# Patient Record
Sex: MALE | Race: WHITE | NOT HISPANIC OR LATINO | Employment: FULL TIME | URBAN - METROPOLITAN AREA
[De-identification: names, ages, dates, MRNs, and addresses within clinical notes are randomized per-mention and may not be internally consistent; named-entity substitution may affect disease eponyms.]

---

## 2018-05-01 ENCOUNTER — HOSPITAL ENCOUNTER (OUTPATIENT)
Dept: LAB | Facility: MEDICAL CENTER | Age: 43
End: 2018-05-01
Attending: PSYCHIATRY & NEUROLOGY
Payer: MEDICAID

## 2018-05-01 LAB
ALBUMIN SERPL BCP-MCNC: 4.2 G/DL (ref 3.2–4.9)
ALBUMIN/GLOB SERPL: 1.4 G/DL
ALP SERPL-CCNC: 99 U/L (ref 30–99)
ALT SERPL-CCNC: 38 U/L (ref 2–50)
ANION GAP SERPL CALC-SCNC: 11 MMOL/L (ref 0–11.9)
APPEARANCE UR: CLEAR
AST SERPL-CCNC: 22 U/L (ref 12–45)
BILIRUB SERPL-MCNC: 0.7 MG/DL (ref 0.1–1.5)
BILIRUB UR QL STRIP.AUTO: NEGATIVE
BUN SERPL-MCNC: 14 MG/DL (ref 8–22)
CALCIUM SERPL-MCNC: 10.2 MG/DL (ref 8.5–10.5)
CHLORIDE SERPL-SCNC: 104 MMOL/L (ref 96–112)
CO2 SERPL-SCNC: 26 MMOL/L (ref 20–33)
COLOR UR: YELLOW
CREAT SERPL-MCNC: 0.98 MG/DL (ref 0.5–1.4)
GLOBULIN SER CALC-MCNC: 2.9 G/DL (ref 1.9–3.5)
GLUCOSE SERPL-MCNC: 77 MG/DL (ref 65–99)
GLUCOSE UR STRIP.AUTO-MCNC: NEGATIVE MG/DL
KETONES UR STRIP.AUTO-MCNC: NEGATIVE MG/DL
LEUKOCYTE ESTERASE UR QL STRIP.AUTO: NEGATIVE
MICRO URNS: NORMAL
NITRITE UR QL STRIP.AUTO: NEGATIVE
PH UR STRIP.AUTO: 7 [PH]
POTASSIUM SERPL-SCNC: 4 MMOL/L (ref 3.6–5.5)
PROT SERPL-MCNC: 7.1 G/DL (ref 6–8.2)
PROT UR QL STRIP: NEGATIVE MG/DL
RBC UR QL AUTO: NEGATIVE
SODIUM SERPL-SCNC: 141 MMOL/L (ref 135–145)
SP GR UR STRIP.AUTO: 1.01
UROBILINOGEN UR STRIP.AUTO-MCNC: 0.2 MG/DL

## 2018-05-01 PROCEDURE — 80053 COMPREHEN METABOLIC PANEL: CPT

## 2018-05-01 PROCEDURE — 81003 URINALYSIS AUTO W/O SCOPE: CPT

## 2018-05-01 PROCEDURE — 36415 COLL VENOUS BLD VENIPUNCTURE: CPT

## 2020-02-18 ENCOUNTER — HOSPITAL ENCOUNTER (OUTPATIENT)
Facility: MEDICAL CENTER | Age: 45
End: 2020-02-18
Attending: SURGERY | Admitting: SURGERY
Payer: MEDICAID

## 2020-02-18 ENCOUNTER — ANESTHESIA (OUTPATIENT)
Dept: SURGERY | Facility: MEDICAL CENTER | Age: 45
End: 2020-02-18
Payer: MEDICAID

## 2020-02-18 ENCOUNTER — ANESTHESIA EVENT (OUTPATIENT)
Dept: SURGERY | Facility: MEDICAL CENTER | Age: 45
End: 2020-02-18
Payer: MEDICAID

## 2020-02-18 VITALS
BODY MASS INDEX: 32.2 KG/M2 | TEMPERATURE: 97.7 F | SYSTOLIC BLOOD PRESSURE: 92 MMHG | OXYGEN SATURATION: 93 % | RESPIRATION RATE: 16 BRPM | DIASTOLIC BLOOD PRESSURE: 56 MMHG | HEART RATE: 59 BPM | HEIGHT: 73 IN | WEIGHT: 242.95 LBS

## 2020-02-18 DIAGNOSIS — G89.18 POSTOPERATIVE PAIN: ICD-10-CM

## 2020-02-18 PROCEDURE — 501838 HCHG SUTURE GENERAL: Performed by: SURGERY

## 2020-02-18 PROCEDURE — 700101 HCHG RX REV CODE 250

## 2020-02-18 PROCEDURE — 700105 HCHG RX REV CODE 258: Performed by: SURGERY

## 2020-02-18 PROCEDURE — 700111 HCHG RX REV CODE 636 W/ 250 OVERRIDE (IP): Performed by: ANESTHESIOLOGY

## 2020-02-18 PROCEDURE — 700101 HCHG RX REV CODE 250: Performed by: ANESTHESIOLOGY

## 2020-02-18 PROCEDURE — 160009 HCHG ANES TIME/MIN: Performed by: SURGERY

## 2020-02-18 PROCEDURE — 160025 RECOVERY II MINUTES (STATS): Performed by: SURGERY

## 2020-02-18 PROCEDURE — 160027 HCHG SURGERY MINUTES - 1ST 30 MINS LEVEL 2: Performed by: SURGERY

## 2020-02-18 PROCEDURE — 700101 HCHG RX REV CODE 250: Performed by: SURGERY

## 2020-02-18 PROCEDURE — A9270 NON-COVERED ITEM OR SERVICE: HCPCS | Performed by: ANESTHESIOLOGY

## 2020-02-18 PROCEDURE — 160035 HCHG PACU - 1ST 60 MINS PHASE I: Performed by: SURGERY

## 2020-02-18 PROCEDURE — 700102 HCHG RX REV CODE 250 W/ 637 OVERRIDE(OP): Performed by: ANESTHESIOLOGY

## 2020-02-18 PROCEDURE — 160038 HCHG SURGERY MINUTES - EA ADDL 1 MIN LEVEL 2: Performed by: SURGERY

## 2020-02-18 PROCEDURE — 160048 HCHG OR STATISTICAL LEVEL 1-5: Performed by: SURGERY

## 2020-02-18 PROCEDURE — 160046 HCHG PACU - 1ST 60 MINS PHASE II: Performed by: SURGERY

## 2020-02-18 PROCEDURE — 160036 HCHG PACU - EA ADDL 30 MINS PHASE I: Performed by: SURGERY

## 2020-02-18 PROCEDURE — 160002 HCHG RECOVERY MINUTES (STAT): Performed by: SURGERY

## 2020-02-18 RX ORDER — OMEPRAZOLE 20 MG/1
20 CAPSULE, DELAYED RELEASE ORAL
COMMUNITY
End: 2021-07-13

## 2020-02-18 RX ORDER — BACLOFEN 10 MG/1
10 TABLET ORAL PRN
COMMUNITY
End: 2021-07-13

## 2020-02-18 RX ORDER — HYDROMORPHONE HYDROCHLORIDE 1 MG/ML
0.1 INJECTION, SOLUTION INTRAMUSCULAR; INTRAVENOUS; SUBCUTANEOUS
Status: DISCONTINUED | OUTPATIENT
Start: 2020-02-18 | End: 2020-02-18 | Stop reason: HOSPADM

## 2020-02-18 RX ORDER — HYDROMORPHONE HYDROCHLORIDE 1 MG/ML
0.2 INJECTION, SOLUTION INTRAMUSCULAR; INTRAVENOUS; SUBCUTANEOUS
Status: DISCONTINUED | OUTPATIENT
Start: 2020-02-18 | End: 2020-02-18 | Stop reason: HOSPADM

## 2020-02-18 RX ORDER — METOPROLOL TARTRATE 1 MG/ML
1 INJECTION, SOLUTION INTRAVENOUS
Status: DISCONTINUED | OUTPATIENT
Start: 2020-02-18 | End: 2020-02-18 | Stop reason: HOSPADM

## 2020-02-18 RX ORDER — OXYCODONE HCL 5 MG/5 ML
5 SOLUTION, ORAL ORAL
Status: COMPLETED | OUTPATIENT
Start: 2020-02-18 | End: 2020-02-18

## 2020-02-18 RX ORDER — BUPIVACAINE HYDROCHLORIDE AND EPINEPHRINE 5; 5 MG/ML; UG/ML
INJECTION, SOLUTION EPIDURAL; INTRACAUDAL; PERINEURAL
Status: DISCONTINUED
Start: 2020-02-18 | End: 2020-02-18 | Stop reason: HOSPADM

## 2020-02-18 RX ORDER — ACETAMINOPHEN 325 MG/1
325 TABLET ORAL EVERY 6 HOURS
Qty: 60 TAB | Refills: 0 | Status: SHIPPED | OUTPATIENT
Start: 2020-02-18 | End: 2021-07-13

## 2020-02-18 RX ORDER — SODIUM CHLORIDE, SODIUM LACTATE, POTASSIUM CHLORIDE, CALCIUM CHLORIDE 600; 310; 30; 20 MG/100ML; MG/100ML; MG/100ML; MG/100ML
INJECTION, SOLUTION INTRAVENOUS CONTINUOUS
Status: DISCONTINUED | OUTPATIENT
Start: 2020-02-18 | End: 2020-02-18 | Stop reason: HOSPADM

## 2020-02-18 RX ORDER — KETOROLAC TROMETHAMINE 30 MG/ML
INJECTION, SOLUTION INTRAMUSCULAR; INTRAVENOUS PRN
Status: DISCONTINUED | OUTPATIENT
Start: 2020-02-18 | End: 2020-02-18 | Stop reason: SURG

## 2020-02-18 RX ORDER — MAGNESIUM SULFATE HEPTAHYDRATE 40 MG/ML
INJECTION, SOLUTION INTRAVENOUS PRN
Status: DISCONTINUED | OUTPATIENT
Start: 2020-02-18 | End: 2020-02-18 | Stop reason: SURG

## 2020-02-18 RX ORDER — OXYCODONE HCL 5 MG/5 ML
10 SOLUTION, ORAL ORAL
Status: COMPLETED | OUTPATIENT
Start: 2020-02-18 | End: 2020-02-18

## 2020-02-18 RX ORDER — HYDRALAZINE HYDROCHLORIDE 20 MG/ML
5 INJECTION INTRAMUSCULAR; INTRAVENOUS
Status: DISCONTINUED | OUTPATIENT
Start: 2020-02-18 | End: 2020-02-18 | Stop reason: HOSPADM

## 2020-02-18 RX ORDER — ONDANSETRON 2 MG/ML
4 INJECTION INTRAMUSCULAR; INTRAVENOUS
Status: DISCONTINUED | OUTPATIENT
Start: 2020-02-18 | End: 2020-02-18 | Stop reason: HOSPADM

## 2020-02-18 RX ORDER — HALOPERIDOL 5 MG/ML
1 INJECTION INTRAMUSCULAR
Status: DISCONTINUED | OUTPATIENT
Start: 2020-02-18 | End: 2020-02-18 | Stop reason: HOSPADM

## 2020-02-18 RX ORDER — DOCUSATE SODIUM 100 MG/1
100 CAPSULE, LIQUID FILLED ORAL 2 TIMES DAILY
Qty: 60 CAP | Refills: 0 | Status: SHIPPED | OUTPATIENT
Start: 2020-02-18 | End: 2021-07-13

## 2020-02-18 RX ORDER — BUPIVACAINE HYDROCHLORIDE AND EPINEPHRINE 5; 5 MG/ML; UG/ML
INJECTION, SOLUTION EPIDURAL; INTRACAUDAL; PERINEURAL
Status: DISCONTINUED | OUTPATIENT
Start: 2020-02-18 | End: 2020-02-18 | Stop reason: HOSPADM

## 2020-02-18 RX ORDER — DEXAMETHASONE SODIUM PHOSPHATE 4 MG/ML
INJECTION, SOLUTION INTRA-ARTICULAR; INTRALESIONAL; INTRAMUSCULAR; INTRAVENOUS; SOFT TISSUE PRN
Status: DISCONTINUED | OUTPATIENT
Start: 2020-02-18 | End: 2020-02-18 | Stop reason: SURG

## 2020-02-18 RX ORDER — IBUPROFEN 600 MG/1
600 TABLET ORAL EVERY 6 HOURS
Qty: 45 TAB | Refills: 0 | Status: SHIPPED | OUTPATIENT
Start: 2020-02-18

## 2020-02-18 RX ORDER — DIPHENHYDRAMINE HYDROCHLORIDE 50 MG/ML
12.5 INJECTION INTRAMUSCULAR; INTRAVENOUS
Status: DISCONTINUED | OUTPATIENT
Start: 2020-02-18 | End: 2020-02-18 | Stop reason: HOSPADM

## 2020-02-18 RX ORDER — DIPHENHYDRAMINE HCL 25 MG
25 TABLET ORAL EVERY 6 HOURS PRN
Status: DISCONTINUED | OUTPATIENT
Start: 2020-02-18 | End: 2020-02-18 | Stop reason: HOSPADM

## 2020-02-18 RX ORDER — HYDROMORPHONE HYDROCHLORIDE 1 MG/ML
0.4 INJECTION, SOLUTION INTRAMUSCULAR; INTRAVENOUS; SUBCUTANEOUS
Status: DISCONTINUED | OUTPATIENT
Start: 2020-02-18 | End: 2020-02-18 | Stop reason: HOSPADM

## 2020-02-18 RX ORDER — MEPERIDINE HYDROCHLORIDE 25 MG/ML
12.5 INJECTION INTRAMUSCULAR; INTRAVENOUS; SUBCUTANEOUS
Status: DISCONTINUED | OUTPATIENT
Start: 2020-02-18 | End: 2020-02-18 | Stop reason: HOSPADM

## 2020-02-18 RX ORDER — DIPHENHYDRAMINE HYDROCHLORIDE 50 MG/ML
25 INJECTION INTRAMUSCULAR; INTRAVENOUS EVERY 6 HOURS PRN
Status: DISCONTINUED | OUTPATIENT
Start: 2020-02-18 | End: 2020-02-18 | Stop reason: HOSPADM

## 2020-02-18 RX ORDER — CEFAZOLIN SODIUM 1 G/3ML
INJECTION, POWDER, FOR SOLUTION INTRAMUSCULAR; INTRAVENOUS PRN
Status: DISCONTINUED | OUTPATIENT
Start: 2020-02-18 | End: 2020-02-18 | Stop reason: SURG

## 2020-02-18 RX ORDER — ONDANSETRON 2 MG/ML
INJECTION INTRAMUSCULAR; INTRAVENOUS PRN
Status: DISCONTINUED | OUTPATIENT
Start: 2020-02-18 | End: 2020-02-18 | Stop reason: SURG

## 2020-02-18 RX ORDER — MIDAZOLAM HYDROCHLORIDE 1 MG/ML
1 INJECTION INTRAMUSCULAR; INTRAVENOUS
Status: DISCONTINUED | OUTPATIENT
Start: 2020-02-18 | End: 2020-02-18 | Stop reason: HOSPADM

## 2020-02-18 RX ORDER — OXYCODONE HYDROCHLORIDE 5 MG/1
5 TABLET ORAL EVERY 4 HOURS PRN
Qty: 12 TAB | Refills: 0 | Status: SHIPPED | OUTPATIENT
Start: 2020-02-18 | End: 2020-02-21

## 2020-02-18 RX ADMIN — KETOROLAC TROMETHAMINE 30 MG: 30 INJECTION, SOLUTION INTRAMUSCULAR at 15:52

## 2020-02-18 RX ADMIN — DEXAMETHASONE SODIUM PHOSPHATE 4 MG: 4 INJECTION, SOLUTION INTRA-ARTICULAR; INTRALESIONAL; INTRAMUSCULAR; INTRAVENOUS; SOFT TISSUE at 15:37

## 2020-02-18 RX ADMIN — SODIUM CHLORIDE, POTASSIUM CHLORIDE, SODIUM LACTATE AND CALCIUM CHLORIDE: 600; 310; 30; 20 INJECTION, SOLUTION INTRAVENOUS at 13:40

## 2020-02-18 RX ADMIN — FENTANYL CITRATE 100 MCG: 50 INJECTION, SOLUTION INTRAMUSCULAR; INTRAVENOUS at 15:37

## 2020-02-18 RX ADMIN — MIDAZOLAM HYDROCHLORIDE 2 MG: 1 INJECTION, SOLUTION INTRAMUSCULAR; INTRAVENOUS at 15:37

## 2020-02-18 RX ADMIN — FENTANYL CITRATE 25 MCG: 0.05 INJECTION, SOLUTION INTRAMUSCULAR; INTRAVENOUS at 17:27

## 2020-02-18 RX ADMIN — OXYCODONE HYDROCHLORIDE 10 MG: 5 SOLUTION ORAL at 17:26

## 2020-02-18 RX ADMIN — MAGNESIUM SULFATE IN WATER 1 G: 40 INJECTION, SOLUTION INTRAVENOUS at 15:37

## 2020-02-18 RX ADMIN — LIDOCAINE HYDROCHLORIDE 40 MG: 20 INJECTION, SOLUTION INFILTRATION; PERINEURAL at 15:37

## 2020-02-18 RX ADMIN — FENTANYL CITRATE 50 MCG: 50 INJECTION, SOLUTION INTRAMUSCULAR; INTRAVENOUS at 18:05

## 2020-02-18 RX ADMIN — PROPOFOL 180 MCG/KG/MIN: 10 INJECTION, EMULSION INTRAVENOUS at 15:37

## 2020-02-18 RX ADMIN — PROPOFOL 200 MG: 10 INJECTION, EMULSION INTRAVENOUS at 15:31

## 2020-02-18 RX ADMIN — ONDANSETRON 4 MG: 2 INJECTION INTRAMUSCULAR; INTRAVENOUS at 15:37

## 2020-02-18 RX ADMIN — CEFAZOLIN 2 G: 330 INJECTION, POWDER, FOR SOLUTION INTRAMUSCULAR; INTRAVENOUS at 15:27

## 2020-02-18 RX ADMIN — SODIUM CHLORIDE, POTASSIUM CHLORIDE, SODIUM LACTATE AND CALCIUM CHLORIDE: 600; 310; 30; 20 INJECTION, SOLUTION INTRAVENOUS at 15:18

## 2020-02-18 RX ADMIN — EPHEDRINE SULFATE 10 MG: 50 INJECTION INTRAVENOUS at 16:27

## 2020-02-18 ASSESSMENT — PAIN SCALES - GENERAL: PAIN_LEVEL: 2

## 2020-02-18 NOTE — ANESTHESIA TIME REPORT
Anesthesia Start and Stop Event Times     Date Time Event    2/18/2020 1523 Ready for Procedure        Responsible Staff    No responsible staff documented.       Preop Diagnosis (Free Text):  Pre-op Diagnosis     UMBILICAL HERNIA        Preop Diagnosis (Codes):    Post op Diagnosis  Umbilical hernia      Premium Reason  A. 3PM - 7AM    Comments:

## 2020-02-18 NOTE — ANESTHESIA PROCEDURE NOTES
Airway  Date/Time: 2/18/2020 3:32 PM  Performed by: Lester Castellanos M.D.  Authorized by: Lester Castellanos M.D.     Location:  OR  Urgency:  Elective  Indications for Airway Management:  Anesthesia  Spontaneous Ventilation: absent    Sedation Level:  Deep  Preoxygenated: Yes    Patient Position:  Sniffing  Final Airway Type:  Supraglottic airway  Final Supraglottic Airway:  Standard LMA  SGA Size:  4  Number of Attempts at Approach:  1

## 2020-02-18 NOTE — OP REPORT
Operative Report    Date: 2/18/2020    Surgeon: Jakob Lewis M.D.     Assistant: None    Pre-operative Diagnosis: Umbilical Hernia    Post-operative Diagnosis: Same    Procedure: Primary open umbilical hernia repair    ASA Classification: I.    Indications: This is a 44 y.o. male who presented with symptoms of umbilical hernia.  Patient is here for repair.    Findings: Primary fascial closure obtained    Wound Classification: Class I, I, Clean.    Procedure in detail: The patient was seen and examined in the preoperative holding area.  The risks benefits and alternatives of the procedure were discussed with the patient who wished to proceed with the procedure as described.  The patient was transferred to the operating room placed in supine position and all pressure points were properly padded.  General endotracheal anesthesia was induced and preoperative antibiotics were given per [facility] protocol.  Patient's abdomen was prepped with ChloraPrep and draped in the normal sterile fashion.  A timeout was performed confirming correct patient, correct procedure, and that all necessary equipment was in the room.      A curvilinear incision was made over the umbilicus after infusing local anesthetic.  A combination of blunt electrodissection was used down to the level of the fascia we then bluntly dissected around the umbilicus and this was elevated.  The umbilicus was dissected free from the underlying hernia sac.  The hernia sac was then reduced and the fascial edges were grasped.  Multiple interrupted 0 Ethibond sutures used to close the umbilical hernia primarily.    The umbilicus was then pexied down using a interrupted 3-0 Vicryl suture.  Skin was closed with a running 4-0 Monocryl suture in a subcuticular fashion.  Dermabond was then placed over the wounds.    The patient was awakened from general anesthetic, and was taken to the recovery room in stable condition.    Sponge and needle counts were correct at the end of  the case.     Specimen: none    EBL: 10mL    Dispo: stable, extubated, to PACU    Jakob Lewis M.D.  [facility]  [phone]

## 2020-02-19 NOTE — ANESTHESIA QCDR
2019 [facility] Clinical Data Registry (for Quality Improvement)     Postoperative nausea/vomiting risk protocol (Adult = 18 yrs and Pediatric 3-17 yrs)- (430 and 463)  General inhalation anesthetic (NOT TIVA) with PONV risk factors: No  Provision of anti-emetic therapy with at least 2 different classes of agents: N/A  Patient DID NOT receive anti-emetic therapy and reason is documented in Medical Record: N/A    Multimodal Pain Management- (477)  Non-emergent surgery AND patient age >= 18: Yes  Use of Multimodal Pain Management, two or more drugs and/or interventions, NOT including systemic opioids: Yes  Exception: Documented allergy to multiple classes of analgesics: N/A    Smoking Abstinence (404)  Patient is current smoker (cigarette, pipe, e-cig, marijuanna): Yes  Elective Surgery: Yes  Abstinence instructions provided prior to day of surgery: No  Patient abstained from smoking on day of surgery:     Pre-Op Beta-Blocker in Isolated CABG (44)  Isolated CABG AND patient age >= 18: No  Beta-blocker admin within 24 hours of surgical incision:   Exception:of medical reason(s) for not administering beta blocker within 24 hours prior to surgical incision (e.g., not  indicated,other medical reason):     PACU assessment of acute postoperative pain prior to Anesthesia Care End- Applies to Patients Age = 18- (ABG7)  Initial PACU pain score is which of the following: < 7/10  Patient unable to report pain score: N/A    Post-anesthetic transfer of care checklist/protocol to PACU/ICU- (426 and 427)  Upon conclusion of case, patient transferred to which of the following locations: PACU/Non-ICU  Use of transfer checklist/protocol: Yes  Exclusion: Service Performed in Patient Hospital Room (and thus did not require transfer): N/A  Unplanned admission to ICU related to anesthesia service up through end of PACU care- (MD51)  Unplanned admission to ICU (not initially anticipated at anesthesia start time): No

## 2020-02-19 NOTE — DISCHARGE INSTRUCTIONS
Hernia Repair Discharge Instructions:    ACTIVITIES: Upon discharge from the hospital, the day of surgery it is requested that you do no significant physical activity and limit mental activities, as you have had sedation. The day after surgery, you may resume activities of daily living, but for six weeks, it is recommended that you do no strenuous activities or heavy lifting (greater than 10 pounds).     DRIVING: You may drive whenever you are off pain medications and are able to perform the activities needed to drive, i.e. turning, bending, twisting, etc.     WOUND: It is not unusual for patients to experience swelling and even bruising at the hernia repair site.      ICE: please use ice on the wound to decrease the swelling for the first 24 hours and then discontinue.     BATHING: The dressing can be removed two days after surgery and the wound can then be wetted in a shower as normal, but avoid submersion in water (tub bath) for at least 2 weeks.    PAIN MEDICATION: You will be given a prescription for pain medication at discharge. Please take these as directed. It is important to remember not to take medications on an empty stomach as this may cause nausea.     BOWEL FUNCTION: After hernia repair, it is not uncommon for patients to experience constipation. This is due to decreasing activity levels as well as pain medications. You may wish to use a stool softener beginning immediately after surgery, and you may or may not need to use a laxative (Milk of Magnesia, Ex-lax; Senokot, etc.) as well.            CALL IF YOU HAVE: (1) Fevers to more than 1010 F, (2) Unusual chest or leg pain,  (3) Drainage or fluid from incision that may be foul smelling, increased  tenderness or soreness at the wound or the wound edges are no longer  together,redness or swelling at the incision site. Please do not hesitate to call  with any other questions.     APPOINTMENT: Contact our office at [phone] for a follow-up appointment  in 2 weeks following your procedure.     If you have any additional questions, please do not hesitate to call the office.     Office address:  [address], [address] [place] [address]        ACTIVITY: Rest and take it easy for the first 24 hours.  A responsible adult is recommended to remain with you during that time.  It is normal to feel sleepy.  We encourage you to not do anything that requires balance, judgment or coordination.    MILD FLU-LIKE SYMPTOMS ARE NORMAL. YOU MAY EXPERIENCE GENERALIZED MUSCLE ACHES, THROAT IRRITATION, HEADACHE AND/OR SOME NAUSEA.    FOR 24 HOURS DO NOT:  Drive, operate machinery or run household appliances.  Drink beer or alcoholic beverages.   Make important decisions or sign legal documents.    SPECIAL INSTRUCTIONS: see above    DIET: To avoid nausea, slowly advance diet as tolerated, avoiding spicy or greasy foods for the first day.  Add more substantial food to your diet according to your physician's instructions.  Babies can be fed formula or breast milk as soon as they are hungry.  INCREASE FLUIDS AND FIBER TO AVOID CONSTIPATION.      FOLLOW-UP APPOINTMENT:  A follow-up appointment should be arranged with your doctor ; call to schedule.    You should CALL YOUR PHYSICIAN if you develop:  Fever greater than 101 degrees F.  Pain not relieved by medication, or persistent nausea or vomiting.  Excessive bleeding (blood soaking through dressing) or unexpected drainage from the wound.  Extreme redness or swelling around the incision site, drainage of pus or foul smelling drainage.  Inability to urinate or empty your bladder within 8 hours.  Problems with breathing or chest pain.    You should call 911 if you develop problems with breathing or chest pain.  If you are unable to contact your doctor or surgical center, you should go to the nearest emergency room or urgent care center.  Physician's telephone #: DR. Lewis [phone]    If any questions arise, call your doctor.  If your  doctor is not available, please feel free to call the Surgical Center at [phone].  The Center is open Monday through Friday from 7AM to 7PM.  You can also call the HEALTH HOTLINE open 24 hours/day, 7 days/week and speak to a nurse at [phone], or toll free at [phone].    A registered nurse may call you a few days after your surgery to see how you are doing after your procedure.    MEDICATIONS: Resume taking daily medication.  Take prescribed pain medication with food.  If no medication is prescribed, you may take non-aspirin pain medication if needed.  PAIN MEDICATION CAN BE VERY CONSTIPATING.  Take a stool softener or laxative such as senokot, pericolace, or milk of magnesia if needed.    Prescription given for oxycodone, acetaminophen, ibuprofen, colace.  Last pain medication given at 5:26 PM .    If your physician has prescribed pain medication that includes Acetaminophen (Tylenol), do not take additional Acetaminophen (Tylenol) while taking the prescribed medication.    Depression / Suicide Risk    As you are discharged from this [facility] facility, it is important to learn how to keep safe from harming yourself.    Recognize the warning signs:  · Abrupt changes in personality, positive or negative- including increase in energy   · Giving away possessions  · Change in eating patterns- significant weight changes-  positive or negative  · Change in sleeping patterns- unable to sleep or sleeping all the time   · Unwillingness or inability to communicate  · Depression  · Unusual sadness, discouragement and loneliness  · Talk of wanting to die  · Neglect of personal appearance   · Rebelliousness- reckless behavior  · Withdrawal from people/activities they love  · Confusion- inability to concentrate     If you or a loved one observes any of these behaviors or has concerns about self-harm, here's what you can do:  · Talk about it- your feelings and reasons for harming yourself  · Remove any means  that you might use to hurt yourself (examples: pills, rope, extension cords, firearm)  · Get professional help from the community (Mental Health, Substance Abuse, psychological counseling)  · Do not be alone:Call your Safe Contact- someone whom you trust who will be there for you.  · Call your local CRISIS HOTLINE 925-9104 or 668-361-2224  · Call your local Children's Mobile Crisis Response Team [place] (678) 772-3243 or www.[employer]  · Call the toll free National Suicide Prevention Hotlines   · National Suicide Prevention Lifeline 531-294-FQPM (2541)  · National Hope Line Network 800-SUICIDE (113-0932)

## 2020-02-19 NOTE — ANESTHESIA POSTPROCEDURE EVALUATION
Patient: Kavon Yang    Procedure Summary     Date:  02/18/20 Room / Location:  [facility] ROOM 25 / SURGERY SAME DAY [facility]    Anesthesia Start:  1526 Anesthesia Stop:  1604    Procedure:  REPAIR, HERNIA, UMBILICAL (N/A Abdomen) Diagnosis:  (UMBILICAL HERNIA)    Surgeon:  Jakob Lewis M.D. Responsible Provider:  Lester Castellanos M.D.    Anesthesia Type:  general ASA Status:  1          Final Anesthesia Type: general  Last vitals  BP   NIBP: 130/89    Temp   36.5 °C (97.7 °F)    Pulse   Pulse: 78   Resp   16    SpO2   97 %      Anesthesia Post Evaluation    Patient location during evaluation: PACU  Patient participation: complete - patient participated  Level of consciousness: awake and alert  Pain score: 2    Airway patency: patent  Anesthetic complications: no  Cardiovascular status: hemodynamically stable  Respiratory status: acceptable  Hydration status: euvolemic    PONV: none           Nurse Pain Score: 2 (NPRS)

## 2021-07-13 ENCOUNTER — HOSPITAL ENCOUNTER (EMERGENCY)
Facility: MEDICAL CENTER | Age: 46
End: 2021-07-13
Attending: EMERGENCY MEDICINE
Payer: MEDICAID

## 2021-07-13 ENCOUNTER — OFFICE VISIT (OUTPATIENT)
Dept: URGENT CARE | Facility: CLINIC | Age: 46
End: 2021-07-13
Payer: MEDICAID

## 2021-07-13 VITALS
TEMPERATURE: 98.4 F | WEIGHT: 224.8 LBS | OXYGEN SATURATION: 97 % | HEART RATE: 109 BPM | SYSTOLIC BLOOD PRESSURE: 100 MMHG | RESPIRATION RATE: 20 BRPM | HEIGHT: 72 IN | DIASTOLIC BLOOD PRESSURE: 60 MMHG | BODY MASS INDEX: 30.45 KG/M2

## 2021-07-13 VITALS
SYSTOLIC BLOOD PRESSURE: 118 MMHG | TEMPERATURE: 98.1 F | RESPIRATION RATE: 16 BRPM | OXYGEN SATURATION: 96 % | WEIGHT: 224.65 LBS | BODY MASS INDEX: 30.43 KG/M2 | DIASTOLIC BLOOD PRESSURE: 78 MMHG | HEART RATE: 98 BPM | HEIGHT: 72 IN

## 2021-07-13 DIAGNOSIS — K08.89 PAIN, DENTAL: ICD-10-CM

## 2021-07-13 DIAGNOSIS — K04.7 DENTAL ABSCESS: ICD-10-CM

## 2021-07-13 PROBLEM — Z13.6 SCREENING FOR OTHER AND UNSPECIFIED CARDIOVASCULAR CONDITIONS: Status: ACTIVE | Noted: 2019-08-21

## 2021-07-13 PROBLEM — K21.9 GASTROESOPHAGEAL REFLUX DISEASE: Status: ACTIVE | Noted: 2021-07-13

## 2021-07-13 PROBLEM — M51.369 DEGENERATION OF INTERVERTEBRAL DISC OF LUMBAR REGION: Status: ACTIVE | Noted: 2019-02-19

## 2021-07-13 PROBLEM — Z13.31 ENCOUNTER FOR SCREENING FOR DEPRESSION: Status: ACTIVE | Noted: 2019-08-21

## 2021-07-13 PROBLEM — M51.36 DEGENERATION OF INTERVERTEBRAL DISC OF LUMBAR REGION: Status: ACTIVE | Noted: 2019-02-19

## 2021-07-13 PROCEDURE — A9270 NON-COVERED ITEM OR SERVICE: HCPCS | Performed by: EMERGENCY MEDICINE

## 2021-07-13 PROCEDURE — 99283 EMERGENCY DEPT VISIT LOW MDM: CPT

## 2021-07-13 PROCEDURE — 700102 HCHG RX REV CODE 250 W/ 637 OVERRIDE(OP): Performed by: EMERGENCY MEDICINE

## 2021-07-13 PROCEDURE — 96372 THER/PROPH/DIAG INJ SC/IM: CPT

## 2021-07-13 PROCEDURE — 99203 OFFICE O/P NEW LOW 30 MIN: CPT | Performed by: NURSE PRACTITIONER

## 2021-07-13 PROCEDURE — 700111 HCHG RX REV CODE 636 W/ 250 OVERRIDE (IP): Performed by: EMERGENCY MEDICINE

## 2021-07-13 RX ORDER — CEFTRIAXONE 1 G/1
1000 INJECTION, POWDER, FOR SOLUTION INTRAMUSCULAR; INTRAVENOUS ONCE
Status: COMPLETED | OUTPATIENT
Start: 2021-07-13 | End: 2021-07-13

## 2021-07-13 RX ORDER — NAPROXEN 500 MG/1
500 TABLET ORAL 2 TIMES DAILY WITH MEALS
Qty: 20 TABLET | Refills: 0 | Status: SHIPPED | OUTPATIENT
Start: 2021-07-13 | End: 2023-11-06

## 2021-07-13 RX ORDER — OXYCODONE HYDROCHLORIDE AND ACETAMINOPHEN 5; 325 MG/1; MG/1
1 TABLET ORAL EVERY 4 HOURS PRN
Status: DISCONTINUED | OUTPATIENT
Start: 2021-07-13 | End: 2021-07-13 | Stop reason: HOSPADM

## 2021-07-13 RX ORDER — NAPROXEN 500 MG/1
375 TABLET ORAL ONCE
Status: COMPLETED | OUTPATIENT
Start: 2021-07-13 | End: 2021-07-13

## 2021-07-13 RX ORDER — AMOXICILLIN 500 MG/1
500 CAPSULE ORAL 3 TIMES DAILY
Qty: 21 CAPSULE | Refills: 0 | Status: SHIPPED | OUTPATIENT
Start: 2021-07-13 | End: 2021-07-20

## 2021-07-13 RX ADMIN — NAPROXEN 375 MG: 500 TABLET ORAL at 18:59

## 2021-07-13 RX ADMIN — CEFTRIAXONE SODIUM 1000 MG: 1 INJECTION, POWDER, FOR SOLUTION INTRAMUSCULAR; INTRAVENOUS at 19:01

## 2021-07-13 RX ADMIN — OXYCODONE HYDROCHLORIDE AND ACETAMINOPHEN 1 TABLET: 5; 325 TABLET ORAL at 18:59

## 2021-07-13 ASSESSMENT — ENCOUNTER SYMPTOMS
SINUS PRESSURE: 0
FEVER: 0

## 2021-07-14 NOTE — ED TRIAGE NOTES
Pt comes in complaining of upper dental pain since Friday evening. Pt today the pain increased and now he is having left sided facial pain. Pt does have an appt with a dentist tomorrow

## 2021-07-14 NOTE — PROGRESS NOTES
Subjective:   Kavon Yang is a 46 y.o. male who presents for Dental Pain (x 5 days, canine tooth is abscessed, jaw and neck are extremely sore, unable to sleep due to the extreme pain.  Today the left lower molar broke toda)      Dental Pain   This is a new problem. The current episode started in the past 7 days. The problem occurs constantly. The problem has been gradually worsening. The pain is at a severity of 10/10. The pain is severe. Associated symptoms include facial pain. Pertinent negatives include no fever, oral bleeding, sinus pressure or thermal sensitivity. He has tried acetaminophen and NSAIDs for the symptoms. The treatment provided no relief.   Patient states that he does have a dental appointment tomorrow morning    Review of Systems   Constitutional: Negative for fever.   HENT: Negative for sinus pressure.         Left upper dental pain with facial swelling.       Medications:    • ibuprofen Tabs    Allergies: Codeine    Problem List: Kavon Yang does not have a problem list on file.    Surgical History:  Past Surgical History:   Procedure Laterality Date   • UMBILICAL HERNIA REPAIR N/A 2/18/2020    Procedure: REPAIR, HERNIA, UMBILICAL;  Surgeon: Jakob Lewis M.D.;  Location: SURGERY SAME DAY [facility];  Service: General       Past Social Hx: Kavon Yang  reports that he has been smoking cigarettes. He has a 15.00 pack-year smoking history. He has quit using smokeless tobacco. He reports current drug use. He reports that he does not drink alcohol.     Past Family Hx:  Kavon Yang family history is not on file.     Problem list, medications, and allergies reviewed by myself today in Epic.     Objective:     /60 (BP Location: Left arm, Patient Position: Sitting, BP Cuff Size: Adult)   Pulse (!) 109   Temp 36.9 °C (98.4 °F) (Temporal)   Resp 20   Ht 1.829 m (6')   Wt 102 kg (224 lb 12.8 oz)   SpO2 97%   BMI 30.49 kg/m²     Physical Exam  Constitutional:        Appearance: Normal appearance. He is not ill-appearing or toxic-appearing.   HENT:      Head: Normocephalic.        Right Ear: External ear normal.      Left Ear: External ear normal.      Nose: Nose normal.      Mouth/Throat:      Lips: Pink.      Mouth: Mucous membranes are moist.      Dentition: Abnormal dentition. Dental tenderness and dental abscesses present. No gum lesions.     Eyes:      General: Lids are normal.         Right eye: No discharge.         Left eye: No discharge.   Pulmonary:      Effort: Pulmonary effort is normal. No accessory muscle usage or respiratory distress.   Musculoskeletal:         General: Normal range of motion.      Cervical back: Full passive range of motion without pain.   Skin:     Coloration: Skin is not pale.   Neurological:      Mental Status: He is alert and oriented to person, place, and time.   Psychiatric:         Mood and Affect: Mood normal.         Thought Content: Thought content normal.         Assessment/Plan:     Diagnosis and associated orders:     1. Dental abscess     2. Pain, dental        Comments/MDM:     • Patient is a 46-year-old male present with stated above, patient having an undiagnosed new problem.  Patient appears to have a dental abscess of the left upper molar, with facial swelling.  Had discussed treatment options with patient as he does have a dental appointment tomorrow.  Had recommended outpatient antibiotic therapy with a prompt evaluation as scheduled however patient wishes to go to the emergency room this evening to have it addressed and for pain control.             Please note that this dictation was created using voice recognition software. I have made a reasonable attempt to correct obvious errors, but I expect that there are errors of grammar and possibly content that I did not discover before finalizing the note.    This note was electronically signed by Jhonny GARNER.

## 2021-07-14 NOTE — ED PROVIDER NOTES
"ED Provider  Scribed for Sameer Pitts D.O. by Aleja Rogers. 7/13/2021  6:38 PM    Means of arrival:Walk In  History obtained from:Patient  History limited by: None     CHIEF COMPLAINT  Chief Complaint   Patient presents with    Dental Pain    Facial Swelling       HPI  Kavon Yang is a 46 y.o. male who presents to the [facility] ED for dental pain onset 4 days ago. The patient states his canine tooth on the left side of his jaw developed an abscess and has been causing moderate pain and swelling. He reports squeezing the abscess and draining fluid out of it a few nights ago, and also complains of pain to his \"bottom molar.\" Patient says he was prompted to visit Urgent Care earlier today after his pain failed to resolve, and was prompted to come into the [facility] ED this evening for further evaluation. No alleviating factors were noted, and pain is exacerbated with palpation. Patient has associated left sided facial pain and facial swelling, but denies shortness of breath, nausea, vomiting, fever or chills. He is allergic to Codeine and does not take any daily medications. Patient adds he has a dentist appointment tomorrow at 10:00 AM.     REVIEW OF SYSTEMS  See HPI for further details.     PAST MEDICAL HISTORY   has a past medical history of Mood swing and Psychiatric disorder.    SOCIAL HISTORY  Social History     Tobacco Use    Smoking status: Current Every Day Smoker     Packs/day: 0.50     Years: 30.00     Pack years: 15.00     Types: Cigarettes    Smokeless tobacco: Former User    Tobacco comment: 3 cigs/ day; hx 2 ppd x 20years   Vaping Use    Vaping Use: Never used   Substance and Sexual Activity    Alcohol use: No    Drug use: Yes     Comment: marajuana occasionally    Sexual activity: Yes     Partners: Female       SURGICAL HISTORY   has a past surgical history that includes umbilical hernia repair (N/A, 2/18/2020).    CURRENT MEDICATIONS  Home Medications       Reviewed by Tammie Cerda R.N. " (Registered Nurse) on 07/13/21 at 1751  Med List Status: <None>     Medication Last Dose Status   ibuprofen (MOTRIN) 600 MG Tab  Active                    ALLERGIES  Allergies   Allergen Reactions    Codeine Shortness of Breath and Swelling       PHYSICAL EXAM  VITAL SIGNS: /78   Pulse (!) 106   Temp 36.4 °C (97.6 °F) (Temporal)   Resp 18   Ht 1.829 m (6')   Wt 102 kg (224 lb 10.4 oz)   SpO2 95%   BMI 30.47 kg/m²   Constitutional: Alert in no apparent distress.  HENT: Left facial swelling in the maxillary area, Tenderness and dental caries to the left upper canine and posterior most molar, No palpable abscess, No signs of trauma, mucous membranes are moist  Eyes: Conjunctiva normal, Non-icteric.   Neck: Normal range of motion, No tenderness, Supple.  Lymphatic: No lymphadenopathy noted.   Cardiovascular: Regular rate and rhythm, no murmurs.   Thorax & Lungs: Normal breath sounds, No respiratory distress, No wheezing, No chest tenderness.   Abdomen: Bowel sounds normal, Soft, No tenderness, No masses, No pulsatile masses. No peritoneal signs.  Skin: Warm, Dry, normal color.   Back: No bony tenderness, No CVA tenderness.   Extremities: No edema, No tenderness, No cyanosis  Musculoskeletal: Good range of motion in all major joints. No tenderness to palpation or major deformities noted.   Neurologic: Alert and oriented x4, Normal motor function, Normal sensory function, No focal deficits noted.   Psychiatric: Affect normal, Judgment normal, Mood normal.     COURSE  Pertinent Labs & Imaging studies reviewed. (See chart for details)    6:38 PM - Patient seen and examined at bedside. Discussed plan of care. The patient will be medicated with 5-325 mg Percocet, 375 mg Naprosyn, and 1,000 mg Rocephin. The plan of care was discussed with the patient. He was encouraged to follow up with a dentist for his symptoms and have his canine and molar removed. Patient was told he will receive antibiotics to treat his  symptoms. He verbalizes understanding and agreement to this plan of care. Patient had the opportunity to ask any questions. The plan for discharge was discussed with the patient and he was told to return for any new or worsening symptoms and to follow up with his PCP. Patient is understanding and agreeable to the plan for discharge.      MEDICAL DECISION MAKING  This is a 46 y.o. male who presents with a dental pain.  He states that he has squeezed fluid out of this and tasted bad.  I suspect he did rupture and abscess.  At this time will be given IM antibiotics, was given p.o. pain medications.  On reevaluation medications had not started working he had an SI pain had not resolved however he will be discharged home and continued on antibiotics, and he has an appoint with dentist tomorrow morning.      The patient will return for new or worsening symptoms and is stable at the time of discharge.    DISPOSITION:  Patient will be discharged home in stable condition.    FOLLOW UP:  Ismael Galan M.D.  [facility]  [id number]  [facility] [address]  [phone]          OUTPATIENT MEDICATIONS:  Discharge Medication List as of 7/13/2021  6:52 PM        START taking these medications    Details   amoxicillin (AMOXIL) 500 MG Cap Take 1 capsule by mouth 3 times a day for 7 days., Disp-21 capsule, R-0, Normal      naproxen (NAPROSYN) 500 MG Tab Take 1 tablet by mouth 2 times a day with meals., Disp-20 tablet, R-0, Normal              FINAL IMPRESSION  1. Dental abscess         Aleja OSEGUERA), am scribing for, and in the presence of, Sameer Pitts D.O..    Electronically signed by: Aleja Smiley), 7/13/2021    Sameer OSEGUERA D.O. personally performed the services described in this documentation, as scribed by Aleja Rogers in my presence, and it is both accurate and complete. E    The note accurately reflects work and decisions made by me.  Sameer Pitts D.O.  7/14/2021  12:11 AM

## 2021-09-01 ENCOUNTER — OFFICE VISIT (OUTPATIENT)
Dept: URGENT CARE | Facility: CLINIC | Age: 46
End: 2021-09-01
Payer: MEDICAID

## 2021-09-01 VITALS
TEMPERATURE: 98.9 F | OXYGEN SATURATION: 96 % | WEIGHT: 214 LBS | SYSTOLIC BLOOD PRESSURE: 128 MMHG | BODY MASS INDEX: 28.99 KG/M2 | RESPIRATION RATE: 16 BRPM | DIASTOLIC BLOOD PRESSURE: 72 MMHG | HEIGHT: 72 IN | HEART RATE: 92 BPM

## 2021-09-01 DIAGNOSIS — K52.9 COLITIS: ICD-10-CM

## 2021-09-01 DIAGNOSIS — R10.9 BILATERAL FLANK PAIN: ICD-10-CM

## 2021-09-01 DIAGNOSIS — R10.84 GENERALIZED ABDOMINAL PAIN: ICD-10-CM

## 2021-09-01 DIAGNOSIS — R16.1 SPLENOMEGALY: ICD-10-CM

## 2021-09-01 LAB
APPEARANCE UR: CLEAR
BILIRUB UR STRIP-MCNC: NEGATIVE MG/DL
COLOR UR AUTO: NORMAL
GLUCOSE UR STRIP.AUTO-MCNC: NEGATIVE MG/DL
KETONES UR STRIP.AUTO-MCNC: NEGATIVE MG/DL
LEUKOCYTE ESTERASE UR QL STRIP.AUTO: NEGATIVE
NITRITE UR QL STRIP.AUTO: NEGATIVE
PH UR STRIP.AUTO: 7 [PH] (ref 5–8)
PROT UR QL STRIP: NEGATIVE MG/DL
RBC UR QL AUTO: NEGATIVE
SP GR UR STRIP.AUTO: 1.02
UROBILINOGEN UR STRIP-MCNC: 1 MG/DL

## 2021-09-01 PROCEDURE — 81002 URINALYSIS NONAUTO W/O SCOPE: CPT | Performed by: PHYSICIAN ASSISTANT

## 2021-09-01 PROCEDURE — 99214 OFFICE O/P EST MOD 30 MIN: CPT | Performed by: PHYSICIAN ASSISTANT

## 2021-09-01 ASSESSMENT — ENCOUNTER SYMPTOMS
ABDOMINAL PAIN: 1
BACK PAIN: 1
FLANK PAIN: 1
VOMITING: 0
FEVER: 0
BOWEL INCONTINENCE: 0
CHILLS: 0
NAUSEA: 0
DIARRHEA: 0

## 2021-09-01 NOTE — PROGRESS NOTES
Subjective:   Kavon Yang is a 46 y.o. male who presents today with   Chief Complaint   Patient presents with   • Back Pain     mid back radiating to abdominal and pelvic  pain x 3 days. Denies fever or chills.        Back Pain  This is a new problem. The current episode started yesterday. The problem occurs constantly. The problem is unchanged. The pain is present in the thoracic spine. The quality of the pain is described as aching. The pain is moderate. The symptoms are aggravated by position and twisting. Associated symptoms include abdominal pain. Pertinent negatives include no bladder incontinence, bowel incontinence, chest pain, dysuria, fever or headaches. He has tried nothing for the symptoms. The treatment provided no relief.   Started as back muscle aches/pain that radiates around towards his abdomen now.  Patient has had a history of kidney stones. States he has been drinking lots of water. Denies any alcohol use or illicit drug use. Some of the pain is in the area where he had the hernia surgery.  Patient has had previous history of umbilical hernia repair.  PMH:  has a past medical history of Mood swing and Psychiatric disorder. He also has no past medical history of ASTHMA, CAD (coronary artery disease), Cancer (HCC), Congestive heart failure (HCC), COPD, Diabetes, Hypertension, Infectious disease, Liver disease, Renal disorder, Seizure disorder (HCC), or Stroke ([facility]).  MEDS:   Current Outpatient Medications:   •  naproxen (NAPROSYN) 500 MG Tab, Take 1 tablet by mouth 2 times a day with meals., Disp: 20 tablet, Rfl: 0  •  ibuprofen (MOTRIN) 600 MG Tab, Take 1 Tab by mouth every 6 hours. Alternate w/ tylenol to take a medication every 3 hrs, take scheduled for 3 days post-op then PRN after, Disp: 45 Tab, Rfl: 0  ALLERGIES:   Allergies   Allergen Reactions   • Codeine Shortness of Breath and Swelling     SURGHX:   Past Surgical History:   Procedure Laterality Date   • UMBILICAL HERNIA REPAIR  N/A 2/18/2020    Procedure: REPAIR, HERNIA, UMBILICAL;  Surgeon: Jakob Lewis M.D.;  Location: SURGERY SAME DAY [facility];  Service: General     SOCHX:  reports that he has been smoking cigarettes. He has a 15.00 pack-year smoking history. He has quit using smokeless tobacco. He reports current drug use. He reports that he does not drink alcohol.  FH: Patient states his mother did recently die of pancreatic cancer.      Review of Systems   Constitutional: Negative for chills and fever.   Respiratory: Negative for shortness of breath.    Cardiovascular: Negative for chest pain and palpitations.   Gastrointestinal: Positive for abdominal pain. Negative for bowel incontinence, diarrhea, nausea and vomiting.   Genitourinary: Positive for flank pain. Negative for bladder incontinence, dysuria, frequency, hematuria and urgency.   Musculoskeletal: Positive for back pain.   Neurological: Negative for dizziness and headaches.        Objective:   /72   Pulse 92   Temp 37.2 °C (98.9 °F) (Temporal)   Resp 16   Ht 1.829 m (6')   Wt 97.1 kg (214 lb)   SpO2 96%   BMI 29.02 kg/m²   Physical Exam  Vitals and nursing note reviewed.   Constitutional:       General: He is not in acute distress.     Appearance: Normal appearance. He is well-developed.   HENT:      Head: Normocephalic and atraumatic.      Right Ear: Hearing normal.      Left Ear: Hearing normal.   Eyes:      Pupils: Pupils are equal, round, and reactive to light.   Cardiovascular:      Rate and Rhythm: Normal rate and regular rhythm.      Heart sounds: Normal heart sounds.   Pulmonary:      Effort: Pulmonary effort is normal.   Abdominal:      General: There is no abdominal bruit.      Palpations: There is no mass or pulsatile mass.      Tenderness: There is generalized abdominal tenderness. There is right CVA tenderness and left CVA tenderness. There is no guarding or rebound.      Hernia: No hernia is present.   Musculoskeletal:      Comments:  Tenderness to palpation to the thoracic back. No muscle spasm or deformity appreciated. Normal movement in all 4 extremities.   Skin:     General: Skin is warm and dry.   Neurological:      Mental Status: He is alert.      Coordination: Coordination normal.   Psychiatric:         Mood and Affect: Mood normal.       UA NEG    Assessment/Plan:   Assessment    1. Generalized abdominal pain  - POCT Urinalysis  - CT-ABDOMEN-PELVIS WITH; Future  - CBC WITH DIFFERENTIAL; Future  - Comp Metabolic Panel; Future  - LIPASE; Future    2. Bilateral flank pain  Discussed with patient our options of getting imaging done today and there are not any availability for appointments the rest of today. Did discuss with him that I would recommend he go to the ER and have work-up done but he does not want to do this. Possible differential discussed with patient including diverticulitis, abdominal aneurysm, kidney stones and he is understanding and agreeable and understanding of potential risk if not going to the ER immediately for further workup at this time. Will have low threshold to go to the ER if symptoms get any worse. Vital signs are stable at visit today.  Offered Covid test but patient declines.  Differential diagnosis, natural history, supportive care, and indications for immediate follow-up discussed.   Patient given instructions and understanding of medications and treatment.    If not improving in 3-5 days, F/U with PCP or return to UC if symptoms worsen.  Strict ER precautions given.  Patient agreeable to plan.  Greater than 30 minutes were spent reviewing patient's chart, examining and obtaining history from patient, and discussing plan of care.       Please note that this dictation was created using voice recognition software. I have made every reasonable attempt to correct obvious errors, but I expect that there are errors of grammar and possibly content that I did not discover before finalizing the note.    Milton Gaviria  NINI

## 2021-09-02 ENCOUNTER — HOSPITAL ENCOUNTER (OUTPATIENT)
Dept: LAB | Facility: MEDICAL CENTER | Age: 46
End: 2021-09-02
Attending: PHYSICIAN ASSISTANT
Payer: MEDICAID

## 2021-09-02 ENCOUNTER — HOSPITAL ENCOUNTER (OUTPATIENT)
Dept: RADIOLOGY | Facility: MEDICAL CENTER | Age: 46
End: 2021-09-02
Attending: PHYSICIAN ASSISTANT
Payer: MEDICAID

## 2021-09-02 DIAGNOSIS — R10.84 GENERALIZED ABDOMINAL PAIN: ICD-10-CM

## 2021-09-02 LAB
ALBUMIN SERPL BCP-MCNC: 4.2 G/DL (ref 3.2–4.9)
ALBUMIN/GLOB SERPL: 1.7 G/DL
ALP SERPL-CCNC: 99 U/L (ref 30–99)
ALT SERPL-CCNC: 129 U/L (ref 2–50)
ANION GAP SERPL CALC-SCNC: 8 MMOL/L (ref 7–16)
AST SERPL-CCNC: 75 U/L (ref 12–45)
BASOPHILS # BLD AUTO: 0.8 % (ref 0–1.8)
BASOPHILS # BLD: 0.07 K/UL (ref 0–0.12)
BILIRUB SERPL-MCNC: 0.7 MG/DL (ref 0.1–1.5)
BUN SERPL-MCNC: 10 MG/DL (ref 8–22)
CALCIUM SERPL-MCNC: 9.8 MG/DL (ref 8.5–10.5)
CHLORIDE SERPL-SCNC: 103 MMOL/L (ref 96–112)
CO2 SERPL-SCNC: 27 MMOL/L (ref 20–33)
CREAT SERPL-MCNC: 0.88 MG/DL (ref 0.5–1.4)
EOSINOPHIL # BLD AUTO: 0.17 K/UL (ref 0–0.51)
EOSINOPHIL NFR BLD: 1.9 % (ref 0–6.9)
ERYTHROCYTE [DISTWIDTH] IN BLOOD BY AUTOMATED COUNT: 44.1 FL (ref 35.9–50)
GLOBULIN SER CALC-MCNC: 2.5 G/DL (ref 1.9–3.5)
GLUCOSE SERPL-MCNC: 104 MG/DL (ref 65–99)
HCT VFR BLD AUTO: 50.2 % (ref 42–52)
HGB BLD-MCNC: 16.9 G/DL (ref 14–18)
IMM GRANULOCYTES # BLD AUTO: 0.05 K/UL (ref 0–0.11)
IMM GRANULOCYTES NFR BLD AUTO: 0.6 % (ref 0–0.9)
LIPASE SERPL-CCNC: 59 U/L (ref 11–82)
LYMPHOCYTES # BLD AUTO: 2.47 K/UL (ref 1–4.8)
LYMPHOCYTES NFR BLD: 28.2 % (ref 22–41)
MCH RBC QN AUTO: 30.6 PG (ref 27–33)
MCHC RBC AUTO-ENTMCNC: 33.7 G/DL (ref 33.7–35.3)
MCV RBC AUTO: 90.8 FL (ref 81.4–97.8)
MONOCYTES # BLD AUTO: 0.7 K/UL (ref 0–0.85)
MONOCYTES NFR BLD AUTO: 8 % (ref 0–13.4)
NEUTROPHILS # BLD AUTO: 5.3 K/UL (ref 1.82–7.42)
NEUTROPHILS NFR BLD: 60.5 % (ref 44–72)
NRBC # BLD AUTO: 0 K/UL
NRBC BLD-RTO: 0 /100 WBC
PLATELET # BLD AUTO: 221 K/UL (ref 164–446)
PMV BLD AUTO: 10.4 FL (ref 9–12.9)
POTASSIUM SERPL-SCNC: 5.1 MMOL/L (ref 3.6–5.5)
PROT SERPL-MCNC: 6.7 G/DL (ref 6–8.2)
RBC # BLD AUTO: 5.53 M/UL (ref 4.7–6.1)
SODIUM SERPL-SCNC: 138 MMOL/L (ref 135–145)
WBC # BLD AUTO: 8.8 K/UL (ref 4.8–10.8)

## 2021-09-02 PROCEDURE — 700117 HCHG RX CONTRAST REV CODE 255: Performed by: PHYSICIAN ASSISTANT

## 2021-09-02 PROCEDURE — 36415 COLL VENOUS BLD VENIPUNCTURE: CPT

## 2021-09-02 PROCEDURE — 83690 ASSAY OF LIPASE: CPT

## 2021-09-02 PROCEDURE — 85025 COMPLETE CBC W/AUTO DIFF WBC: CPT

## 2021-09-02 PROCEDURE — 74177 CT ABD & PELVIS W/CONTRAST: CPT

## 2021-09-02 PROCEDURE — 80053 COMPREHEN METABOLIC PANEL: CPT

## 2021-09-02 RX ORDER — AMOXICILLIN AND CLAVULANATE POTASSIUM 875; 125 MG/1; MG/1
1 TABLET, FILM COATED ORAL 2 TIMES DAILY
Qty: 14 TABLET | Refills: 0 | Status: SHIPPED | OUTPATIENT
Start: 2021-09-02 | End: 2021-09-09

## 2021-09-02 RX ADMIN — IOHEXOL 25 ML: 240 INJECTION, SOLUTION INTRATHECAL; INTRAVASCULAR; INTRAVENOUS; ORAL at 09:18

## 2021-09-02 RX ADMIN — IOHEXOL 100 ML: 350 INJECTION, SOLUTION INTRAVENOUS at 09:18

## 2021-09-02 ASSESSMENT — ENCOUNTER SYMPTOMS
SHORTNESS OF BREATH: 0
PALPITATIONS: 0
HEADACHES: 0
DIZZINESS: 0

## 2021-09-05 NOTE — RESULT ENCOUNTER NOTE
The patient wasn't home and the individual that answered said he wouldn't be home until tomorrow.  I asked her to leave a message for him to call urgent care for his test results.

## 2021-10-11 ENCOUNTER — OFFICE VISIT (OUTPATIENT)
Dept: URGENT CARE | Facility: CLINIC | Age: 46
End: 2021-10-11
Payer: MEDICAID

## 2021-10-11 ENCOUNTER — HOSPITAL ENCOUNTER (OUTPATIENT)
Facility: MEDICAL CENTER | Age: 46
End: 2021-10-11
Attending: PHYSICIAN ASSISTANT
Payer: MEDICAID

## 2021-10-11 VITALS
SYSTOLIC BLOOD PRESSURE: 124 MMHG | TEMPERATURE: 97.1 F | BODY MASS INDEX: 28.31 KG/M2 | HEART RATE: 90 BPM | RESPIRATION RATE: 16 BRPM | DIASTOLIC BLOOD PRESSURE: 70 MMHG | OXYGEN SATURATION: 100 % | WEIGHT: 209 LBS | HEIGHT: 72 IN

## 2021-10-11 DIAGNOSIS — Z20.822 CLOSE EXPOSURE TO COVID-19 VIRUS: ICD-10-CM

## 2021-10-11 DIAGNOSIS — B34.9 NONSPECIFIC SYNDROME SUGGESTIVE OF VIRAL ILLNESS: ICD-10-CM

## 2021-10-11 PROCEDURE — 99213 OFFICE O/P EST LOW 20 MIN: CPT | Mod: CS | Performed by: PHYSICIAN ASSISTANT

## 2021-10-11 PROCEDURE — U0003 INFECTIOUS AGENT DETECTION BY NUCLEIC ACID (DNA OR RNA); SEVERE ACUTE RESPIRATORY SYNDROME CORONAVIRUS 2 (SARS-COV-2) (CORONAVIRUS DISEASE [COVID-19]), AMPLIFIED PROBE TECHNIQUE, MAKING USE OF HIGH THROUGHPUT TECHNOLOGIES AS DESCRIBED BY CMS-2020-01-R: HCPCS

## 2021-10-11 PROCEDURE — U0005 INFEC AGEN DETEC AMPLI PROBE: HCPCS

## 2021-10-11 ASSESSMENT — FIBROSIS 4 INDEX: FIB4 SCORE: 1.4

## 2021-10-11 NOTE — PROGRESS NOTES
Subjective:   Kavon Yang is a 46 y.o. male who presents for Coronavirus Screening (nausea and stomach pain with body aches and feeling hot an cold)        +Covid exposure  Patient presents with concerns of malaise, fatigue x2 days due to possible COVID-19.  He also endorses sweats and chills.  He denies fever and has been checking his temperature at home.  Taste and smell are normal.  No cough.  Mild congestion.  No chest pain, shortness of breath, diarrhea.  Other members of the household tested positive for COVID-19 in the last week.    ROS    PMH:  has a past medical history of Mood swing and Psychiatric disorder. He also has no past medical history of ASTHMA, CAD (coronary artery disease), Cancer (HCC), Congestive heart failure (HCC), COPD, Diabetes, Hypertension, Infectious disease, Liver disease, Renal disorder, Seizure disorder (HCC), or Stroke (HCC).  MEDS:   Current Outpatient Medications:   •  naproxen (NAPROSYN) 500 MG Tab, Take 1 tablet by mouth 2 times a day with meals. (Patient not taking: Reported on 10/11/2021), Disp: 20 tablet, Rfl: 0  •  ibuprofen (MOTRIN) 600 MG Tab, Take 1 Tab by mouth every 6 hours. Alternate w/ tylenol to take a medication every 3 hrs, take scheduled for 3 days post-op then PRN after (Patient not taking: Reported on 10/11/2021), Disp: 45 Tab, Rfl: 0  ALLERGIES:   Allergies   Allergen Reactions   • Codeine Shortness of Breath and Swelling     SURGHX:   Past Surgical History:   Procedure Laterality Date   • UMBILICAL HERNIA REPAIR N/A 2/18/2020    Procedure: REPAIR, HERNIA, UMBILICAL;  Surgeon: Jakob Lewis M.D.;  Location: SURGERY SAME DAY [facility];  Service: General     SOCHX:  reports that he has been smoking cigarettes. He has a 15.00 pack-year smoking history. He has quit using smokeless tobacco. He reports current drug use. He reports that he does not drink alcohol.  FH: Family history was reviewed, no pertinent findings to report   Objective:   /70 (BP  Location: Left arm, Patient Position: Sitting, BP Cuff Size: Adult long)   Pulse 90   Temp 36.2 °C (97.1 °F) (Temporal)   Resp 16   Ht 1.829 m (6')   Wt 94.8 kg (209 lb)   SpO2 100%   BMI 28.35 kg/m²   Physical Exam  Vitals reviewed.   Constitutional:       General: He is not in acute distress.     Appearance: Normal appearance. He is well-developed. He is not toxic-appearing.   HENT:      Head: Normocephalic and atraumatic.      Right Ear: External ear normal.      Left Ear: External ear normal.      Nose: Nose normal.   Eyes:      General: Gaze aligned appropriately.   Cardiovascular:      Rate and Rhythm: Normal rate and regular rhythm.      Heart sounds: Normal heart sounds.   Pulmonary:      Effort: Pulmonary effort is normal. No respiratory distress.      Breath sounds: No stridor. No decreased breath sounds, wheezing, rhonchi or rales.   Musculoskeletal:      Cervical back: Neck supple.   Skin:     General: Skin is warm and dry.      Capillary Refill: Capillary refill takes less than 2 seconds.   Neurological:      Mental Status: He is alert and oriented to person, place, and time.      Comments: CN2-12 grossly intact   Psychiatric:         Speech: Speech normal.         Behavior: Behavior normal.           Assessment/Plan:   1. Nonspecific syndrome suggestive of viral illness  - COVID/SARS CoV-2 PCR; Future    2. Close exposure to COVID-19 virus  - COVID/SARS CoV-2 PCR; Future      Discussed with patient signs and symptoms most likely are due to a viral etiology.     Test for COVID-19. We will call/message back for results and appropriate further instructions. Instructed to sign up for [employer] if they have not already. Result will be released to [email or web address] application.   Symptomatic and supportive care:   Plenty of oral hydration and rest   Over the counter cough suppressant as directed.  Tylenol or ibuprofen for pain and fever as directed.   Saline nasal spray and Flonase as a decongestant.   Infection  control measures at home. Stay away from people, Hand washing, covering sneeze/cough, disinfect surfaces.   Remain home from work, school, and other populated environments.     Differential diagnosis, natural history, supportive care, and indications for immediate follow-up discussed.

## 2021-10-12 DIAGNOSIS — Z20.822 CLOSE EXPOSURE TO COVID-19 VIRUS: ICD-10-CM

## 2021-10-12 DIAGNOSIS — B34.9 NONSPECIFIC SYNDROME SUGGESTIVE OF VIRAL ILLNESS: ICD-10-CM

## 2021-10-12 LAB
COVID ORDER STATUS COVID19: NORMAL
SARS-COV-2 RNA RESP QL NAA+PROBE: DETECTED
SPECIMEN SOURCE: ABNORMAL

## 2021-10-13 NOTE — RESULT ENCOUNTER NOTE
Contacted the patient and let him know he's positive for Covid.  He said he feels horrible. He said his back is starting to hurt.  I suggested he go to the ER and get checked out.  He agreed based on how his symptoms are progressing.

## 2021-12-18 ENCOUNTER — OFFICE VISIT (OUTPATIENT)
Dept: URGENT CARE | Facility: PHYSICIAN GROUP | Age: 46
End: 2021-12-18
Payer: MEDICAID

## 2021-12-18 VITALS
OXYGEN SATURATION: 98 % | RESPIRATION RATE: 14 BRPM | DIASTOLIC BLOOD PRESSURE: 70 MMHG | HEART RATE: 98 BPM | SYSTOLIC BLOOD PRESSURE: 118 MMHG | BODY MASS INDEX: 28.31 KG/M2 | TEMPERATURE: 97.7 F | HEIGHT: 72 IN | WEIGHT: 209 LBS

## 2021-12-18 DIAGNOSIS — H00.036 CELLULITIS OF LEFT EYELID: ICD-10-CM

## 2021-12-18 PROCEDURE — 99214 OFFICE O/P EST MOD 30 MIN: CPT | Performed by: NURSE PRACTITIONER

## 2021-12-18 ASSESSMENT — FIBROSIS 4 INDEX: FIB4 SCORE: 1.4

## 2021-12-18 ASSESSMENT — ENCOUNTER SYMPTOMS
PHOTOPHOBIA: 0
EYE DISCHARGE: 0
FOREIGN BODY SENSATION: 0
BLURRED VISION: 0
FEVER: 0

## 2021-12-18 NOTE — PROGRESS NOTES
Subjective:     Kavon Yang is a 46 y.o. male who presents for Eye Problem (left eye swollen and painful x 1 days )      Acute onset eye pain while walking in to the store. Face and eye were swollen. Eyelid feels heavy. Watery discharge. Pain, stating it's more irritated, more than an itch. Warm compress helped, stating it was swollen shut completely before.          Eye Problem   The left eye is affected. This is a new problem. The problem occurs constantly. The problem has been waxing and waning. The pain is at a severity of 4/10. The pain is moderate. He does not wear contacts. Pertinent negatives include no blurred vision, eye discharge, fever, foreign body sensation or photophobia. The treatment provided moderate relief.       Past Medical History:   Diagnosis Date   • Mood swing    • Psychiatric disorder     BIPOLAR       Past Surgical History:   Procedure Laterality Date   • UMBILICAL HERNIA REPAIR N/A 2/18/2020    Procedure: REPAIR, HERNIA, UMBILICAL;  Surgeon: Jakob Lewis M.D.;  Location: SURGERY SAME DAY [facility];  Service: General       Social History     Socioeconomic History   • Marital status: Single     Spouse name: Not on file   • Number of children: Not on file   • Years of education: Not on file   • Highest education level: Not on file   Occupational History   • Not on file   Tobacco Use   • Smoking status: Current Every Day Smoker     Packs/day: 0.50     Years: 30.00     Pack years: 15.00     Types: Cigarettes   • Smokeless tobacco: Former User   • Tobacco comment: 3 cigs/ day; hx 2 ppd x 20years   Vaping Use   • Vaping Use: Never used   Substance and Sexual Activity   • Alcohol use: Yes   • Drug use: Yes     Types: Marijuana, Inhaled     Comment: marajuana occasionally   • Sexual activity: Yes     Partners: Female   Other Topics Concern   • Not on file   Social History Narrative   • Not on file     Social Determinants of Health     Financial Resource Strain:    • Difficulty of  Paying Living Expenses: Not on file   Food Insecurity:    • Worried About Running Out of Food in the Last Year: Not on file   • Ran Out of Food in the Last Year: Not on file   Transportation Needs:    • Lack of Transportation (Medical): Not on file   • Lack of Transportation (Non-Medical): Not on file   Physical Activity:    • Days of Exercise per Week: Not on file   • Minutes of Exercise per Session: Not on file   Stress:    • Feeling of Stress : Not on file   Social Connections:    • Frequency of Communication with Friends and Family: Not on file   • Frequency of Social Gatherings with Friends and Family: Not on file   • Attends Anabaptism Services: Not on file   • Active Member of Clubs or Organizations: Not on file   • Attends Club or Organization Meetings: Not on file   • Marital Status: Not on file   Intimate Partner Violence:    • Fear of Current or Ex-Partner: Not on file   • Emotionally Abused: Not on file   • Physically Abused: Not on file   • Sexually Abused: Not on file   Housing Stability:    • Unable to Pay for Housing in the Last Year: Not on file   • Number of Places Lived in the Last Year: Not on file   • Unstable Housing in the Last Year: Not on file        History reviewed. No pertinent family history.     Allergies   Allergen Reactions   • Codeine Shortness of Breath and Swelling       Review of Systems   Constitutional: Negative for fever.   Eyes: Positive for pain. Negative for blurred vision, photophobia and discharge.   All other systems reviewed and are negative.       Objective:   /70   Pulse 98   Temp 36.5 °C (97.7 °F) (Temporal)   Resp 14   Ht 1.829 m (6')   Wt 94.8 kg (209 lb)   SpO2 98%   BMI 28.35 kg/m²     Physical Exam  Vitals reviewed.   Eyes:      General:         Left eye: No foreign body or discharge.      Conjunctiva/sclera:      Left eye: Left conjunctiva is not injected. No chemosis or hemorrhage.     Pupils: Pupils are equal, round, and reactive to light.       Left eye: No corneal abrasion or fluorescein uptake.      Comments: Unable to fully open eye for exam, edema and erythema to upper lid. Reported tenderness with EOM. Reports difficulty with movement to left; Ophthalmoplegia. No noted proptosis.   Pulmonary:      Effort: Pulmonary effort is normal. No respiratory distress.   Skin:     General: Skin is warm and dry.      Findings: Erythema present. No rash.   Neurological:      Mental Status: He is oriented to person, place, and time.         Assessment/Plan:   1. Cellulitis of left eyelid  -Referred to the ER for further evaluation. Discussed concerns for orbital cellulitis with pain with EOM, vs preseptal cellulitis. Indications for CT with difficulty with movement to the left; Ophthalmoplegia.    Differential diagnosis, natural history, supportive care, and indications for immediate follow-up discussed.

## 2021-12-18 NOTE — PATIENT INSTRUCTIONS
Orbital Cellulitis    Orbital cellulitis is an infection in the eye socket (orbit) and the tissues that surround the eye. The infection can spread to the eyelids, eyebrow area, and cheek. It can also cause a pocket of pus to develop around the eye (orbital abscess). In severe cases, the infection can spread to the brain. Orbital cellulitis is a medical emergency.  What are the causes?  The most common cause of this condition is a bacterial infection. The infection usually spreads to the eye socket from another part of the body. The infection may start in one of these places:  · Nose or sinuses.  · Eyelids.  · Facial skin.  · Bloodstream.  What increases the risk?  You are more likely to develop this condition if you recently had one of the following:  · Upper respiratory infection. This affects the nose, throat, and upper air passages.  · Sinus infection.  · Eyelid or facial infection.  · Tooth infection.  · Eye injury or an object on the surface of the eye or in the eyeball that should not be there (foreign body).  · Infection that affects the entire body or the bloodstream (systemic infection).  What are the signs or symptoms?  Symptoms of this condition usually start quickly. Symptoms may include:  · Eye pain that gets worse with eye movement.  · Swelling around the eye.  · Eye redness.  · Bulging of the eye.  · Inability to move the eye.  · Double vision or decreased vision.  · Fever.  How is this diagnosed?  This condition may be diagnosed based on your symptoms and an eye exam. You may also have tests to confirm the diagnosis and to check for an orbital abscess. Other tests (cultures) may be done to find out which specific bacteria are causing the infection. Tests may include:  · Complete blood count (CBC).  · Blood culture.  · Nose, sinus, or throat culture.  · Imaging studies, such as a CT scan or MRI.  How is this treated?  This condition is usually treated in a hospital. Antibiotic medicines are given  directly into a vein through an IV.  · At first, you may get IV antibiotics to kill bacteria that often cause orbital cellulitis (broad spectrum antibiotics).  · Your medicine may be changed if the culture test results suggest that another antibiotic would be better.  · If the IV antibiotics are working to treat your infection, you may be switched to oral antibiotics and allowed to go home.  · In some cases, surgery may be needed to drain an orbital abscess.  Follow these instructions at home:  · Take over-the-counter and prescription medicines only as told by your health care provider.  · Take your antibiotic medicine as told by your health care provider. Do not stop taking the antibiotic even if you start to feel better.  · Return to your normal activities as told by your health care provider. Ask your health care provider what activities are safe for you.  · Keep all follow-up visits as told by your health care provider. This is important.  Contact a health care provider if:  · You have questions about your medicines.  · Your pain is not well-controlled.  Get help right away if:  · Your eye pain or swelling returns or it gets worse.  · You have any changes in your vision.  · You develop a fever.  · You have vomiting.  · You develop a severe headache or numbness in your face.  Summary  · Orbital cellulitis is an infection in the eye socket (orbit) and the tissues that surround the eye. The infection can spread to other areas.  · Symptoms usually start quickly. Some of the symptoms include eye pain that gets worse with movement, redness and swelling around the eye, and double or decreased vision.  · Orbital cellulitis is a medical emergency, and it requires IV antibiotics for treatment.  · See your health care provider right away if your symptoms return or get worse.  This information is not intended to replace advice given to you by your health care provider. Make sure you discuss any questions you have with your  health care provider.  Document Released: 12/12/2002 Document Revised: 11/30/2018 Document Reviewed: 09/13/2018  [employer] Patient Education © 2020 [employer]      Preseptal Cellulitis, Adult    Preseptal cellulitis is an infection of the eyelid and the tissues around the eye (periorbital area). The infection causes painful swelling and redness. This condition may also be called periorbital cellulitis.  In most cases, the condition can be treated with antibiotic medicine at home. It is important to treat preseptal cellulitis right away so that it does not get worse. If it gets worse, it can spread to the eye socket and eye muscles (orbital cellulitis). Orbital cellulitis is a medical emergency.  What are the causes?  Preseptal cellulitis is most commonly caused by bacteria. In rare cases, it can be caused by a virus or fungus. The germs that cause preseptal cellulitis may come from:  · A sinus infection that spreads near the eyes.  · An injury near the eye, such as a scratch, animal bite, or insect bite.  · A skin rash that becomes infected, such as eczema or poison ivy.  · An infected pimple on the eyelid (stye).  · Infection after eyelid surgery or injury.  What increases the risk?  You are more likely to develop this condition if:  · You have a weakened disease-fighting system (immune system).  · You have a medical condition that raises your risk for sinus infections, such as nasal polyps.  What are the signs or symptoms?  Symptoms of this condition usually develop suddenly. Symptoms may include:  · Eyelids that are red and swollen and feel unusually hot.  · Fever.  · Difficulty opening the eye.  · Headache.  · Facial pain.  How is this diagnosed?  This condition may be diagnosed based on your symptoms, your medical history, and an eye exam. You may have tests, such as:  · Blood tests.  · CT scan.  · MRI.  How is this treated?  This condition is treated with antibiotic medicines. These may be given by mouth  (orally), through an IV, or as a shot. In rare cases, you may need surgery to drain an infected area.  Follow these instructions at home:  Medicines  · If you were prescribed an antibiotic to take at home, take it as told by your health care provider. Do not stop taking the antibiotic even if you start to feel better.  · Take over-the-counter and prescription medicines only as told by your health care provider.  Eye Care  · Do not use eye drops without first getting approval from your health care provider.  · Do not touch or rub your eye. If you wear contact lenses, do not wear them until your health care provider approves.  · Keep the eye area clean and dry.  · Wash the eye area with a clean washcloth, warm water, and baby shampoo or mild soap.  · To help relieve discomfort, place a clean washcloth that is wet with warm water over your eye. Leave the washcloth on for a few minutes, then remove it.  General instructions  · Wash your hands with soap and water often. If soap and water are not available, use hand .  · Do not use any products that contain nicotine or tobacco, such as cigarettes and e-cigarettes. If you need help quitting, ask your health care provider.  · Drink enough fluid to keep your urine pale yellow.  · Ask your health care provider if it is safe for you to drive.  · Stay up to date on your vaccinations.  · Keep all follow-up visits as told by your health care provider. This includes any visits with an eye specialist (ophthalmologist) or dentist. This is important.  Get help right away if:  · You have new symptoms.  · Your symptoms get worse or do not get better with treatment.  · You have a fever.  · Your vision becomes blurry or gets worse in any way.  · Your eye looks like it is sticking out or bulging out (proptosis).  · You have trouble moving your eyes.  · You have a severe headache.  · You have neck stiffness or severe neck pain.  Summary  · Preseptal cellulitis is an infection of  the eyelid and the tissues around the eye.  · Symptoms of preseptal cellulitis usually develop suddenly and include red and swollen eyelids, fever, difficulty opening the eye, headache, and facial pain.  · This condition is treated with antibiotic medicines. Do not stop taking the antibiotic even if you start to feel better.  This information is not intended to replace advice given to you by your health care provider. Make sure you discuss any questions you have with your health care provider.  Document Released: 01/20/2012 Document Revised: 11/30/2018 Document Reviewed: 10/10/2018  [employer] Patient Education © 2020 [employer]

## 2021-12-28 PROBLEM — F41.9 ANXIETY DISORDER: Status: ACTIVE | Noted: 2021-12-28

## 2021-12-28 ASSESSMENT — ENCOUNTER SYMPTOMS: EYE PAIN: 1

## 2022-09-21 ENCOUNTER — HOSPITAL ENCOUNTER (EMERGENCY)
Facility: MEDICAL CENTER | Age: 47
End: 2022-09-21
Attending: EMERGENCY MEDICINE
Payer: MEDICAID

## 2022-09-21 ENCOUNTER — APPOINTMENT (OUTPATIENT)
Dept: RADIOLOGY | Facility: MEDICAL CENTER | Age: 47
End: 2022-09-21
Attending: EMERGENCY MEDICINE
Payer: MEDICAID

## 2022-09-21 VITALS
HEIGHT: 72 IN | BODY MASS INDEX: 30.28 KG/M2 | DIASTOLIC BLOOD PRESSURE: 85 MMHG | OXYGEN SATURATION: 98 % | HEART RATE: 79 BPM | RESPIRATION RATE: 19 BRPM | SYSTOLIC BLOOD PRESSURE: 122 MMHG | WEIGHT: 223.55 LBS | TEMPERATURE: 97.8 F

## 2022-09-21 DIAGNOSIS — J44.1 CHRONIC OBSTRUCTIVE PULMONARY DISEASE WITH ACUTE EXACERBATION (HCC): ICD-10-CM

## 2022-09-21 PROCEDURE — 94640 AIRWAY INHALATION TREATMENT: CPT

## 2022-09-21 PROCEDURE — 96372 THER/PROPH/DIAG INJ SC/IM: CPT

## 2022-09-21 PROCEDURE — 71045 X-RAY EXAM CHEST 1 VIEW: CPT

## 2022-09-21 PROCEDURE — 700111 HCHG RX REV CODE 636 W/ 250 OVERRIDE (IP): Performed by: EMERGENCY MEDICINE

## 2022-09-21 PROCEDURE — 99284 EMERGENCY DEPT VISIT MOD MDM: CPT

## 2022-09-21 PROCEDURE — A9270 NON-COVERED ITEM OR SERVICE: HCPCS | Performed by: EMERGENCY MEDICINE

## 2022-09-21 PROCEDURE — 700102 HCHG RX REV CODE 250 W/ 637 OVERRIDE(OP): Performed by: EMERGENCY MEDICINE

## 2022-09-21 PROCEDURE — 94760 N-INVAS EAR/PLS OXIMETRY 1: CPT

## 2022-09-21 PROCEDURE — 700101 HCHG RX REV CODE 250: Performed by: EMERGENCY MEDICINE

## 2022-09-21 RX ORDER — IPRATROPIUM BROMIDE AND ALBUTEROL SULFATE 2.5; .5 MG/3ML; MG/3ML
3 SOLUTION RESPIRATORY (INHALATION)
Status: DISCONTINUED | OUTPATIENT
Start: 2022-09-21 | End: 2022-09-21 | Stop reason: HOSPADM

## 2022-09-21 RX ORDER — BENZONATATE 100 MG/1
100 CAPSULE ORAL 3 TIMES DAILY PRN
Qty: 30 CAPSULE | Refills: 0 | Status: SHIPPED | OUTPATIENT
Start: 2022-09-21

## 2022-09-21 RX ORDER — DEXAMETHASONE SODIUM PHOSPHATE 4 MG/ML
6 INJECTION, SOLUTION INTRA-ARTICULAR; INTRALESIONAL; INTRAMUSCULAR; INTRAVENOUS; SOFT TISSUE ONCE
Status: COMPLETED | OUTPATIENT
Start: 2022-09-21 | End: 2022-09-21

## 2022-09-21 RX ORDER — BENZONATATE 100 MG/1
100 CAPSULE ORAL 3 TIMES DAILY PRN
Qty: 30 CAPSULE | Refills: 0 | Status: SHIPPED | OUTPATIENT
Start: 2022-09-21 | End: 2022-09-21 | Stop reason: SDUPTHER

## 2022-09-21 RX ORDER — DOXYCYCLINE 100 MG/1
100 CAPSULE ORAL 2 TIMES DAILY
Qty: 14 CAPSULE | Refills: 0 | Status: SHIPPED | OUTPATIENT
Start: 2022-09-21 | End: 2022-09-21 | Stop reason: SDUPTHER

## 2022-09-21 RX ORDER — DOXYCYCLINE 100 MG/1
100 TABLET ORAL ONCE
Status: COMPLETED | OUTPATIENT
Start: 2022-09-21 | End: 2022-09-21

## 2022-09-21 RX ORDER — DOXYCYCLINE 100 MG/1
100 CAPSULE ORAL 2 TIMES DAILY
Qty: 14 CAPSULE | Refills: 0 | Status: SHIPPED | OUTPATIENT
Start: 2022-09-21 | End: 2022-09-28

## 2022-09-21 RX ADMIN — DEXAMETHASONE SODIUM PHOSPHATE 6 MG: 4 INJECTION, SOLUTION INTRA-ARTICULAR; INTRALESIONAL; INTRAMUSCULAR; INTRAVENOUS; SOFT TISSUE at 09:34

## 2022-09-21 RX ADMIN — IPRATROPIUM BROMIDE AND ALBUTEROL SULFATE 3 ML: 2.5; .5 SOLUTION RESPIRATORY (INHALATION) at 09:34

## 2022-09-21 RX ADMIN — DOXYCYCLINE 100 MG: 100 TABLET, FILM COATED ORAL at 09:34

## 2022-09-21 ASSESSMENT — FIBROSIS 4 INDEX: FIB4 SCORE: 1.43

## 2022-09-21 NOTE — ED NOTES
ERP at bedside. Pt agrees with plan of care discussed by ERP. AIDET acknowledged with patient. Hilary in low position, side rail up for pt safety. Call light within reach. Will continue to monitor.

## 2022-09-21 NOTE — ED TRIAGE NOTES
Pt amb to triage c/o nasal congestion, cough and sore throat x1wk. Pt seen at [facility] x4d ago and was given an albuterol inhaler.pt last used inhaler this am <2h ago

## 2022-09-21 NOTE — FLOWSHEET NOTE
09/21/22 0935   Events/Summary/Plan   Events/Summary/Plan SVN w/mask given (duoneb)   Skin Inspection Respiratory Device Intact   Vital Signs   Pulse 82   Respiration 18   Pulse Oximetry 100 %   $ Pulse Oximetry (Spot Check) Yes   Respiratory Assessment   Respiratory Pattern Within Normal Limits   Chest Exam   Work Of Breathing / Effort Within Normal Limits   Breath Sounds   RUL Breath Sounds Clear   RML Breath Sounds Clear;Diminished   RLL Breath Sounds Clear;Diminished   ILAN Breath Sounds Clear   LLL Breath Sounds Diminished   Secretions   Cough Non Productive;Strong   How Sputum Obtained Spontaneous   Oxygen   O2 Delivery Device None - Room Air
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
## 2023-03-23 ENCOUNTER — APPOINTMENT (OUTPATIENT)
Dept: RADIOLOGY | Facility: MEDICAL CENTER | Age: 48
End: 2023-03-23
Attending: EMERGENCY MEDICINE
Payer: MEDICAID

## 2023-03-23 ENCOUNTER — HOSPITAL ENCOUNTER (EMERGENCY)
Facility: MEDICAL CENTER | Age: 48
End: 2023-03-23
Attending: EMERGENCY MEDICINE
Payer: MEDICAID

## 2023-03-23 VITALS
HEIGHT: 72 IN | RESPIRATION RATE: 20 BRPM | HEART RATE: 97 BPM | WEIGHT: 218.03 LBS | BODY MASS INDEX: 29.53 KG/M2 | SYSTOLIC BLOOD PRESSURE: 125 MMHG | OXYGEN SATURATION: 98 % | TEMPERATURE: 97.6 F | DIASTOLIC BLOOD PRESSURE: 78 MMHG

## 2023-03-23 DIAGNOSIS — M54.41 ACUTE BILATERAL LOW BACK PAIN WITH RIGHT-SIDED SCIATICA: ICD-10-CM

## 2023-03-23 PROCEDURE — 96375 TX/PRO/DX INJ NEW DRUG ADDON: CPT

## 2023-03-23 PROCEDURE — 99284 EMERGENCY DEPT VISIT MOD MDM: CPT

## 2023-03-23 PROCEDURE — 96365 THER/PROPH/DIAG IV INF INIT: CPT

## 2023-03-23 PROCEDURE — 700111 HCHG RX REV CODE 636 W/ 250 OVERRIDE (IP): Performed by: EMERGENCY MEDICINE

## 2023-03-23 PROCEDURE — 700101 HCHG RX REV CODE 250: Performed by: EMERGENCY MEDICINE

## 2023-03-23 PROCEDURE — 72100 X-RAY EXAM L-S SPINE 2/3 VWS: CPT

## 2023-03-23 PROCEDURE — 700105 HCHG RX REV CODE 258: Performed by: EMERGENCY MEDICINE

## 2023-03-23 RX ORDER — LIDOCAINE 50 MG/G
1 PATCH TOPICAL ONCE
Status: DISCONTINUED | OUTPATIENT
Start: 2023-03-23 | End: 2023-03-23 | Stop reason: HOSPADM

## 2023-03-23 RX ORDER — LIDOCAINE 50 MG/G
1 PATCH TOPICAL EVERY 24 HOURS
Qty: 10 PATCH | Refills: 3 | Status: SHIPPED | OUTPATIENT
Start: 2023-03-23 | End: 2023-03-25 | Stop reason: SDUPTHER

## 2023-03-23 RX ORDER — DEXAMETHASONE SODIUM PHOSPHATE 10 MG/ML
10 INJECTION, SOLUTION INTRAMUSCULAR; INTRAVENOUS ONCE
Status: COMPLETED | OUTPATIENT
Start: 2023-03-23 | End: 2023-03-23

## 2023-03-23 RX ORDER — KETOROLAC TROMETHAMINE 10 MG/1
10 TABLET, FILM COATED ORAL 3 TIMES DAILY PRN
Qty: 15 TABLET | Refills: 0 | Status: SHIPPED | OUTPATIENT
Start: 2023-03-23 | End: 2023-03-25 | Stop reason: SDUPTHER

## 2023-03-23 RX ORDER — KETOROLAC TROMETHAMINE 30 MG/ML
30 INJECTION, SOLUTION INTRAMUSCULAR; INTRAVENOUS ONCE
Status: COMPLETED | OUTPATIENT
Start: 2023-03-23 | End: 2023-03-23

## 2023-03-23 RX ORDER — MORPHINE SULFATE 4 MG/ML
4 INJECTION INTRAVENOUS ONCE
Status: COMPLETED | OUTPATIENT
Start: 2023-03-23 | End: 2023-03-23

## 2023-03-23 RX ORDER — METHOCARBAMOL 750 MG/1
750 TABLET, FILM COATED ORAL 4 TIMES DAILY
Qty: 120 TABLET | Refills: 0 | Status: SHIPPED | OUTPATIENT
Start: 2023-03-23 | End: 2023-03-25 | Stop reason: SDUPTHER

## 2023-03-23 RX ORDER — ONDANSETRON 2 MG/ML
4 INJECTION INTRAMUSCULAR; INTRAVENOUS ONCE
Status: COMPLETED | OUTPATIENT
Start: 2023-03-23 | End: 2023-03-23

## 2023-03-23 RX ORDER — METHYLPREDNISOLONE 4 MG/1
TABLET ORAL
Qty: 1 EACH | Refills: 0 | Status: SHIPPED | OUTPATIENT
Start: 2023-03-23 | End: 2023-03-25 | Stop reason: SDUPTHER

## 2023-03-23 RX ADMIN — MORPHINE SULFATE 4 MG: 4 INJECTION, SOLUTION INTRAMUSCULAR; INTRAVENOUS at 20:11

## 2023-03-23 RX ADMIN — DEXAMETHASONE SODIUM PHOSPHATE 10 MG: 10 INJECTION INTRAMUSCULAR; INTRAVENOUS at 21:20

## 2023-03-23 RX ADMIN — METHOCARBAMOL 1000 MG: 100 INJECTION INTRAMUSCULAR; INTRAVENOUS at 20:42

## 2023-03-23 RX ADMIN — ONDANSETRON 4 MG: 2 INJECTION INTRAMUSCULAR; INTRAVENOUS at 20:12

## 2023-03-23 RX ADMIN — KETOROLAC TROMETHAMINE 30 MG: 30 INJECTION, SOLUTION INTRAMUSCULAR at 20:11

## 2023-03-23 RX ADMIN — LIDOCAINE 1 PATCH: 50 PATCH CUTANEOUS at 21:30

## 2023-03-23 ASSESSMENT — PAIN DESCRIPTION - PAIN TYPE: TYPE: ACUTE PAIN

## 2023-03-23 ASSESSMENT — FIBROSIS 4 INDEX: FIB4 SCORE: 1.43

## 2023-03-24 NOTE — DISCHARGE INSTRUCTIONS
Follow-up with your primary care provider or [facility], call tomorrow to schedule an appointment.  You will need to have recheck as if you do not get better or you may need to have some further testing.  Take the medications I am prescribing you as directed with food  Apply moist heat to your low back or hot tub soaks in Epsom salts, you can use topical Biofreeze and then apply moist heat  Rest and return if bowel or bladder incontinence or leg weakness.

## 2023-03-24 NOTE — ED PROVIDER NOTES
"  ER Provider Note    Scribed for Jen Quintero D.O. by Timothy Cronin. 3/23/2023  7:02 PM    Primary Care Provider: Pcp Pt States None    CHIEF COMPLAINT  Chief Complaint   Patient presents with    Low Back Pain     Radiating down R leg. Reports chronic issues with low back, denies known new injury.      LIMITATION TO HISTORY   Select: : None    HPI/ROS    OUTSIDE HISTORIAN(S):  Significant other Girlfriend provided collateral information    Kavon Yang is a 47 y.o. male who presents to the ED for worsening back pain onset four days ago. He states he has \"no clue what he did\". He endorses that when he stands up his right side is \"white hot\". His pain radiates from behind his right buttock to his right ankle, and he states that it feels like something is tearing. He denies any previous trauma to the region. He has a history of C6-7 injury following a fall onto a step. He endorses that he can feel his testicles, and has not had any trouble with bowel movements.    PAST MEDICAL HISTORY  Past Medical History:   Diagnosis Date    Mood swing     Psychiatric disorder     BIPOLAR       SURGICAL HISTORY  Past Surgical History:   Procedure Laterality Date    UMBILICAL HERNIA REPAIR N/A 2/18/2020    Procedure: REPAIR, HERNIA, UMBILICAL;  Surgeon: Jakob Lewis M.D.;  Location: SURGERY SAME DAY [facility];  Service: General     FAMILY HISTORY  None noted    SOCIAL HISTORY   reports that he has been smoking cigarettes. He has a 15.00 pack-year smoking history. He has quit using smokeless tobacco. He reports current alcohol use. He reports current drug use. Drugs: Marijuana and Inhaled.    CURRENT MEDICATIONS  Discharge Medication List as of 3/23/2023  9:21 PM        CONTINUE these medications which have NOT CHANGED    Details   benzonatate (TESSALON) 100 MG Cap Take 1 Capsule by mouth 3 times a day as needed for Cough., Disp-30 Capsule, R-0, Normal      naproxen (NAPROSYN) 500 MG Tab Take 1 tablet by mouth 2 times a " day with meals., Disp-20 tablet, R-0, Normal      ibuprofen (MOTRIN) 600 MG Tab Take 1 Tab by mouth every 6 hours. Alternate w/ tylenol to take a medication every 3 hrs, take scheduled for 3 days post-op then PRN after, Disp-45 Tab, R-0, Print Rx Paper             ALLERGIES  Codeine    PHYSICAL EXAM  /88   Pulse 97   Temp 36.2 °C (97.1 °F) (Temporal)   Resp 20   Ht 1.829 m (6')   Wt 98.9 kg (218 lb 0.6 oz)   SpO2 97%   BMI 29.57 kg/m²     Constitutional: Patient is well developed, well nourished. Non-toxic appearing.  Moderate distress.  Cardiovascular: Normal heart rate and Regular rhythm. No murmur  Thorax & Lungs: Clear and equal breath sounds with good excursion. No respiratory distress  Abdomen: Bowel sounds normal in all four quadrants. Soft,nontender, obese, no rebound , guarding, palpable masses.   Skin: Warm, Dry, No rashes.   Back: Lying on right side, complaining of midline lumbar paraspinal muscle tenderness extending into right SI joint and gluteal. Deep reflex +2/4 bilaterally. No cervical or thoracic tenderness. No CVA tenderness.   Extremities: Peripheral pulses 4/4 No edema, No tenderness, normal range of motion.  Musculoskeletal: She has no cervical or thoracic tenderness.  All of his pain is in the lumbar region extending down his right leg consistent with sciatica  Neurologic: Patient is awake alert and oriented to time place and person, he has normal motor and sensory function.  There is no lateralizing deficits.  Tendon reflexes are +2/4 bilaterally in the upper and lower extremities.  He has normal dorsi and plantarflexion, normal gait upon ambulation.and subjective searing pain down his right leg consistent with sciatica.    DIAGNOSTIC STUDIES & PROCEDURES    Radiology:   The attending Emergency Physician has independently interpreted the diagnostic imaging associated with this visit and is awaiting the final reading from the radiologist, which will be displayed  below.  Preliminary interpretation is a follows: No acute abnormality  Radiologist interpretation:     DX-LUMBAR SPINE-2 OR 3 VIEWS   Final Result         No acute osseous abnormality.   No malalignment.         COURSE & MEDICAL DECISION MAKING    ED Observation Status? Yes; I am placing the patient in to an observation status due to a diagnostic uncertainty as well as therapeutic intensity. Patient placed in observation status at 7:32 PM, 3/23/2023.     Observation plan is as follows: Monitor for medication effectiveness and diagnostic imaging    Upon Reevaluation, the patient's condition has: Improved; and will be discharged.    Patient discharged from ED Observation status at 9:08 PM (Time) 3/23/23 (Date).     INITIAL ASSESSMENT AND PLAN  Care Narrative: Patient was treated with an IV and Robaxin, morphine, Toradol, Zofran was given.  He was improved upon recheck but still complains of some right leg sciatica.  I gave him Decadron 10 mg IV push then a Lidoderm patch to his back and he will be sent home with prescriptions for Robaxin, Toradol, Lidoderm patches and Medrol Dosepak.  He is to apply moist heat to his low back or hot tub soaks in Epsom salts, follow-up with his primary care provider within the week for recheck and return of bowel or bladder incontinence or leg weakness.  He was given a work note through next Monday.  He is stable upon discharge in the care of his wife.      7:02 PM - Patient seen and evaluated at bedside. Patient will be treated with Toradol 30 mg, Robaxin 1000 mg in 100 mL, morphine 4 mg and Zofran 4 mg for his symptoms. Ordered DX-lumbar spine to evaluate. He understands and agrees to the plan of care. Differential diagnoses include but are not limited to: lumbar strain vs herniated disc.    9:08 PM - Patient was reevaluated at bedside. Discussed radiology results with the patient and informed them that they are reassuring, no fracture is visualized. Informed him that I believe this  is likely a lumbar strain and will pass with time. Discussed with patient that he can take Tylenol and Motrin as needed for pain. Patient will now be discharged at this time. Discussed return precautions and plan for at home care. Patient verbalizes understanding and agreement to this plan of care.                    DISPOSITION AND DISCUSSIONS    Discussion of management with other [facility] or appropriate source(s): None     Escalation of care considered, and ultimately not performed: blood analysis.    Barriers to care at this time, including but not limited to: Patient does not have established PCP.     Decision tools and prescription drugs considered including, but not limited to: Pain Medications Toradol 30 mg, Robaxin 1000 mg in 100 mL and morphine 4 mg .    DISPOSITION:  Patient will be discharged home in stable condition.    FOLLOW UP:  [facility]  [address]  [place] [address]  [phone]  Schedule an appointment as soon as possible for a visit in 1 week      OUTPATIENT MEDICATIONS:  Discharge Medication List as of 3/23/2023  9:21 PM        START taking these medications    Details   ketorolac (TORADOL) 10 MG Tab Take 1 Tablet by mouth 3 times a day as needed for Moderate Pain. Take with food, Disp-15 Tablet, R-0, Normal      methocarbamol (ROBAXIN) 750 MG Tab Take 1 Tablet by mouth 4 times a day. As needed for muscle spasm/strain, Disp-120 Tablet, R-0, Normal      lidocaine (LIDODERM) 5 % Patch Place 1 Patch on the skin every 24 hours., Disp-10 Patch, R-3, Normal      methylPREDNISolone (MEDROL DOSEPAK) 4 MG Tablet Therapy Pack Take as directed with food, Disp-1 Each, R-0, Normal            FINAL IMPRESSION   1. Acute bilateral low back pain with right-sided sciatica      Timothy OSEGUERA (Bean), am scribing for, and in the presence of, Jen Quintero D.O..    Electronically signed by: Timothy Smiley), 3/23/2023    Jen OSEGUERA D.O. personally performed the  services described in this documentation, as scribed by Timothy Cronin in my presence, and it is both accurate and complete.    The note accurately reflects work and decisions made by me.  Jen Quintero D.O.  3/23/2023  11:55 PM

## 2023-03-24 NOTE — ED NOTES
Patient is stable for discharge at this time, anticipatory guidance provided, close follow-up is encouraged, and ED return instructions have been detailed. Patient is both agreeable to the disposition and plan and discharged home in ambulatory state and in good condition.      Rx education provided, Pt verbalized understanding.

## 2023-03-25 RX ORDER — METHOCARBAMOL 750 MG/1
750 TABLET, FILM COATED ORAL 4 TIMES DAILY
Qty: 12 TABLET | Refills: 0 | Status: SHIPPED | OUTPATIENT
Start: 2023-03-25 | End: 2023-03-28

## 2023-03-25 RX ORDER — LIDOCAINE 50 MG/G
1 PATCH TOPICAL EVERY 24 HOURS
Qty: 10 PATCH | Refills: 3 | Status: SHIPPED | OUTPATIENT
Start: 2023-03-25

## 2023-03-25 RX ORDER — KETOROLAC TROMETHAMINE 10 MG/1
10 TABLET, FILM COATED ORAL 3 TIMES DAILY PRN
Qty: 15 TABLET | Refills: 0 | Status: SHIPPED | OUTPATIENT
Start: 2023-03-25

## 2023-03-25 RX ORDER — METHYLPREDNISOLONE 4 MG/1
TABLET ORAL
Qty: 1 EACH | Refills: 0 | Status: SHIPPED | OUTPATIENT
Start: 2023-03-25

## 2023-11-06 ENCOUNTER — HOSPITAL ENCOUNTER (EMERGENCY)
Facility: MEDICAL CENTER | Age: 48
End: 2023-11-06
Attending: EMERGENCY MEDICINE
Payer: MEDICAID

## 2023-11-06 VITALS
DIASTOLIC BLOOD PRESSURE: 94 MMHG | HEIGHT: 72 IN | OXYGEN SATURATION: 99 % | SYSTOLIC BLOOD PRESSURE: 128 MMHG | BODY MASS INDEX: 28.31 KG/M2 | RESPIRATION RATE: 19 BRPM | HEART RATE: 82 BPM | TEMPERATURE: 98.4 F | WEIGHT: 209 LBS

## 2023-11-06 DIAGNOSIS — M79.671 BILATERAL FOOT PAIN: ICD-10-CM

## 2023-11-06 DIAGNOSIS — S90.829A FRICTION BLISTERS OF THE SOLES, UNSPECIFIED LATERALITY, INITIAL ENCOUNTER: ICD-10-CM

## 2023-11-06 DIAGNOSIS — M79.672 BILATERAL FOOT PAIN: ICD-10-CM

## 2023-11-06 PROCEDURE — 700102 HCHG RX REV CODE 250 W/ 637 OVERRIDE(OP): Mod: UD | Performed by: EMERGENCY MEDICINE

## 2023-11-06 PROCEDURE — 99283 EMERGENCY DEPT VISIT LOW MDM: CPT

## 2023-11-06 PROCEDURE — A9270 NON-COVERED ITEM OR SERVICE: HCPCS | Mod: UD | Performed by: EMERGENCY MEDICINE

## 2023-11-06 RX ORDER — OXYCODONE HYDROCHLORIDE 5 MG/1
5 TABLET ORAL EVERY 6 HOURS PRN
Qty: 10 TABLET | Refills: 0 | Status: SHIPPED | OUTPATIENT
Start: 2023-11-06 | End: 2023-11-06 | Stop reason: SDUPTHER

## 2023-11-06 RX ORDER — ACETAMINOPHEN 500 MG
500-1000 TABLET ORAL EVERY 8 HOURS PRN
Qty: 180 TABLET | Refills: 0 | Status: SHIPPED | OUTPATIENT
Start: 2023-11-06 | End: 2023-11-06 | Stop reason: SDUPTHER

## 2023-11-06 RX ORDER — HYDROCODONE BITARTRATE AND ACETAMINOPHEN 5; 325 MG/1; MG/1
1 TABLET ORAL ONCE
Status: COMPLETED | OUTPATIENT
Start: 2023-11-06 | End: 2023-11-06

## 2023-11-06 RX ORDER — OXYCODONE HYDROCHLORIDE 5 MG/1
5 TABLET ORAL EVERY 6 HOURS PRN
Qty: 10 TABLET | Refills: 0 | Status: SHIPPED | OUTPATIENT
Start: 2023-11-06 | End: 2023-11-09

## 2023-11-06 RX ORDER — NAPROXEN 500 MG/1
500 TABLET ORAL
Qty: 60 TABLET | Refills: 0 | Status: SHIPPED | OUTPATIENT
Start: 2023-11-06 | End: 2023-11-06 | Stop reason: SDUPTHER

## 2023-11-06 RX ORDER — NAPROXEN 500 MG/1
500 TABLET ORAL
Qty: 60 TABLET | Refills: 0 | Status: SHIPPED | OUTPATIENT
Start: 2023-11-06 | End: 2023-12-06

## 2023-11-06 RX ORDER — IBUPROFEN 600 MG/1
600 TABLET ORAL ONCE
Status: COMPLETED | OUTPATIENT
Start: 2023-11-06 | End: 2023-11-06

## 2023-11-06 RX ORDER — ACETAMINOPHEN 500 MG
500-1000 TABLET ORAL EVERY 8 HOURS PRN
Qty: 180 TABLET | Refills: 0 | Status: SHIPPED | OUTPATIENT
Start: 2023-11-06 | End: 2023-12-06

## 2023-11-06 RX ADMIN — IBUPROFEN 600 MG: 600 TABLET, FILM COATED ORAL at 01:54

## 2023-11-06 RX ADMIN — HYDROCODONE BITARTRATE AND ACETAMINOPHEN 1 TABLET: 5; 325 TABLET ORAL at 01:53

## 2023-11-06 ASSESSMENT — PAIN DESCRIPTION - PAIN TYPE: TYPE: ACUTE PAIN

## 2023-11-06 NOTE — ED TRIAGE NOTES
Chief Complaint   Patient presents with    Foot Pain       Pt wheelchair to triage. Pt A&Ox4, for above complaint.     Pt report blisters on top of both feet. Noted swelling and redness. Pt reports toe pain, 9/10. CMS intact.     Pt back to lobby . Pt educated on alerting staff in changes to condition. Pt verbalized understanding.     Blood Pressure (Abnormal) 132/96   Pulse 98   Temperature 35.9 °C (96.6 °F) (Temporal)   Respiration 19   Height 1.829 m (6')   Weight 94.8 kg (208 lb 15.9 oz)   Oxygen Saturation 98%   Body Mass Index 28.34 kg/m²

## 2023-11-06 NOTE — ED NOTES
Ambulatory to room [facility] assisted by ED jazmine Gaviria with same report as triage. Alert and oriented not in distress.   Chart up for ERP to see.

## 2023-11-06 NOTE — ED PROVIDER NOTES
ED Provider Note    Scribed for Dr. Maldonado by Laxmi Acuna. 11/6/2023,  1:32 AM.      CHIEF COMPLAINT  Chief Complaint   Patient presents with    Foot Pain       EXTERNAL RECORDS REVIEWED  PDMP no prior records    HPI  LIMITATION TO HISTORY   Select: : None  OUTSIDE HISTORIAN(S):  None.    Kavon Yang is a 48 y.o. male who presents to the Emergency Department for evaluation of foot pain onset earlier today. He describes that he had gotten off of work and decided to take a nap. When the patient woke up, he notes that his feet were covered in blisters. He notes that he is a  and is on his feet for long periods of  time, but denies having something like this occur to him prior. The patient states that he also has swelling and redness to both his feet, but denies any fever. He also denies any change in his work routine. No alleviating or exacerbating factors noted.       REVIEW OF SYSTEMS  See HPI for further details. All other systems are negative.     PAST MEDICAL HISTORY     Past Medical History:   Diagnosis Date    Mood swing     Psychiatric disorder     BIPOLAR     SURGICAL HISTORY  Past Surgical History:   Procedure Laterality Date    UMBILICAL HERNIA REPAIR N/A 2/18/2020    Procedure: REPAIR, HERNIA, UMBILICAL;  Surgeon: Jakob Lewis M.D.;  Location: SURGERY SAME DAY [facility];  Service: General     FAMILY HISTORY  No family history pertinent.    SOCIAL HISTORY    reports that he has been smoking cigarettes. He has a 15.0 pack-year smoking history. He has quit using smokeless tobacco. He reports current alcohol use. He reports current drug use. Drugs: Marijuana and Inhaled.    CURRENT MEDICATIONS  Home Medications    **Home medications have not yet been reviewed for this encounter**       ALLERGIES  Allergies   Allergen Reactions    Codeine Shortness of Breath and Swelling     PHYSICAL EXAM  VITAL SIGNS: BP (!) 132/96   Pulse 98   Temp 35.9 °C (96.6 °F) (Temporal)   Resp 19   Ht  1.829 m (6')   Wt 94.8 kg (208 lb 15.9 oz)   SpO2 98%   BMI 28.34 kg/m²   Gen: Alert, mild distress   CV: No JVD, regular rate and rhythm.  Abd: non-distended  Ext: No deformities. Multiple abrasions and excoriations to both feet.  Multiple small blisters particularly on the pads of the toes.  No erythema.  No fluctuance.  Distal CSM intact.  2+ dorsalis pedis pulses.  No edema.  Neuro: speech fluent, GCS 15, was extremities    COURSE & MEDICAL DECISION MAKING  Pertinent Labs & Imaging studies were reviewed. (See chart for details)    ED Observation Status? No; the patient does not meet criteria for ED observation.      1:32 AM Patient seen and examined at bedside. I informed the patient that I would be discharging him home with a few prescriptions to treat the pain and the excoriations. Discussed discharge instructions and return precautions with the patient and they were cleared for discharge. Patient was given the opportunity to ask any further questions. He is comfortable with discharge at this time.       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  His exam shows no signs of infection, no history of trauma.  He appears to have blisters as well as excoriations from working in his boots.  At this point, no evidence of ischemia, infection, trauma.  Will provide pain medications    ADDITIONAL PROBLEM LIST AND DISPOSITION      I have discussed management of the patient with the following medical professionals:  None.    Escalation of care considered, and ultimately not performed: blood analysis and diagnostic imaging.     Barriers to care at this time, including but not limited to: Patient does not have established PCP.     Decision tools and prescription drugs considered including, but not limited to: Pain Medications see below .      I reviewed prescription monitoring program for patient's narcotic use before prescribing a  scheduled drug.The patient will not drink alcohol nor drive with prescribed medications. The patient will return for new or worsening symptoms and is stable at the time of discharge.    The patient is referred to a primary physician for blood pressure management, diabetic screening, and for all other preventative health concerns.    In prescribing controlled substances to this patient, I certify that I have obtained and reviewed the medical history of Kavon Yang. I have also made a good dolores effort to obtain applicable records from other providers who have treated the patient and records did not demonstrate any increased risk of substance abuse that would prevent me from prescribing controlled substances.     I have conducted a physical exam and documented it. I have reviewed Mr. Yang’s prescription history as maintained by the [place] Prescription Monitoring Program.     I have assessed the patient’s risk for abuse, dependency, and addiction using the validated Opioid Risk Tool available at https://www.mdcalc.com/madcqg-krqe-xasp-ort-narcotic-abuse.     Given the above, I believe the benefits of controlled substance therapy outweigh the risks. The reasons for prescribing controlled substances include non-narcotic, oral analgesic alternatives have been inadequate for pain control. Accordingly, I have discussed the risk and benefits, treatment plan, and alternative therapies with the patient.      Pain medications initially sent to the 24-hour [facility], however they do not take his insurance, and at his request were changed to the [facility] [address]    DISPOSITION:  Patient will be discharged home in stable condition.    FOLLOW UP:  [facility], Emergency Dept  [address] [id number] [phone]    If symptoms worsen    Your regular doctor.  To establish a primary care provider within our system, please call [phone]          OUTPATIENT  MEDICATIONS:  Discharge Medication List as of 11/6/2023  2:16 AM        START taking these medications    Details   acetaminophen (TYLENOL) 500 MG Tab Take 1-2 Tablets by mouth every 8 hours as needed for Mild Pain for up to 30 days., Disp-180 Tablet, R-0, Normal      oxyCODONE immediate-release (ROXICODONE) 5 MG Tab Take 1 Tablet by mouth every 6 hours as needed for Severe Pain for up to 3 days., Disp-10 Tablet, R-0, Normal              FINAL IMPRESSION  1. Bilateral foot pain    2. Friction blisters of the soles, unspecified laterality, initial encounter      I, Laxmi Acuna (Scribe), am scribing for, and in the presence of, Thanh Maldonado M.D..    Electronically signed by: Laxmi Acuna (Scribe), 11/6/2023    IThanh M.D. personally performed the services described in this documentation, as scribed by Laxmi Acuna in my presence, and it is both accurate and complete.    The note accurately reflects work and decisions made by me.  Thanh Maldonado M.D.  11/6/2023  3:19 AM      This dictation was created using voice recognition software. The accuracy of the dictation is limited to the abilities of the software. I expect there may be some errors of grammar and possibly content. The nursing notes were reviewed and certain aspects of this information were incorporated into this note.

## 2023-11-06 NOTE — ED NOTES
Patient discharged home to self care, provided with paper copy of discharge instructions. Discussed discharge instructions and follow up appointments, patient verbalizes understanding. Vital signs stable, patient continues to return to sleep after frequently instructed to get dressed and leave. Security called for assistance in escorting patient out of ED

## 2023-11-06 NOTE — DISCHARGE INSTRUCTIONS
You were seen in the emergency room in the setting of foot pain after working.  You have blisters and abrasions on your feet that are likely causing your symptoms.  Thankfully there is no signs of infection at this point in time.    You are being prescribed medications to use for your pain.  Please use the nonopioid pain medications first.  Keep your feet warm with soap and water and thoroughly dry them.        You are being sent home with an opioid pain medication. This medication causes sedation and you should not drive or operate machinery while taking it. You should not use alcohol or recreational drugs while taking this medication. Side effects of this medication can include itching, nausea, and constipation.    There is a risk of addiction to this medication and you should only take the smallest amount necessary to control your symptoms. You should use other non-opioid pain medications for your baseline pain and only use this medication if necessary.     There are many alternatives to pain medications, such as massage, acupuncture, and meditation. Check with your health insurance regarding their coverage of these complementary treatments.    We are not able to refill opioid or other controlled substance prescriptions in the emergency department. If you are having ongoing pain that requires opioids, please see your primary care provider or specialist for further prescriptions.     Return to the emergency department or seek medical attention if you develop:  Spreading redness, fever, pus draining from the wounds, any other new or concerning findings

## 2023-11-20 ENCOUNTER — HOSPITAL ENCOUNTER (EMERGENCY)
Facility: MEDICAL CENTER | Age: 48
End: 2023-11-20
Attending: STUDENT IN AN ORGANIZED HEALTH CARE EDUCATION/TRAINING PROGRAM
Payer: MEDICAID

## 2023-11-20 VITALS
OXYGEN SATURATION: 94 % | RESPIRATION RATE: 18 BRPM | TEMPERATURE: 98.5 F | HEART RATE: 96 BPM | WEIGHT: 204 LBS | BODY MASS INDEX: 27.63 KG/M2 | SYSTOLIC BLOOD PRESSURE: 133 MMHG | HEIGHT: 72 IN | DIASTOLIC BLOOD PRESSURE: 78 MMHG

## 2023-11-20 DIAGNOSIS — T69.029A TRENCH FOOT, UNSPECIFIED LATERALITY, INITIAL ENCOUNTER: ICD-10-CM

## 2023-11-20 DIAGNOSIS — L03.119 CELLULITIS OF LOWER EXTREMITY, UNSPECIFIED LATERALITY: ICD-10-CM

## 2023-11-20 PROCEDURE — 99283 EMERGENCY DEPT VISIT LOW MDM: CPT

## 2023-11-20 PROCEDURE — 700102 HCHG RX REV CODE 250 W/ 637 OVERRIDE(OP): Mod: UD | Performed by: STUDENT IN AN ORGANIZED HEALTH CARE EDUCATION/TRAINING PROGRAM

## 2023-11-20 PROCEDURE — A9270 NON-COVERED ITEM OR SERVICE: HCPCS | Mod: UD | Performed by: STUDENT IN AN ORGANIZED HEALTH CARE EDUCATION/TRAINING PROGRAM

## 2023-11-20 RX ORDER — IBUPROFEN 600 MG/1
600 TABLET ORAL ONCE
Status: COMPLETED | OUTPATIENT
Start: 2023-11-20 | End: 2023-11-20

## 2023-11-20 RX ORDER — ACETAMINOPHEN 325 MG/1
650 TABLET ORAL ONCE
Status: COMPLETED | OUTPATIENT
Start: 2023-11-20 | End: 2023-11-20

## 2023-11-20 RX ORDER — CEPHALEXIN 500 MG/1
500 CAPSULE ORAL 2 TIMES DAILY
Qty: 10 CAPSULE | Refills: 0 | Status: ACTIVE | OUTPATIENT
Start: 2023-11-20 | End: 2023-11-25

## 2023-11-20 RX ORDER — CEPHALEXIN 500 MG/1
500 CAPSULE ORAL ONCE
Status: COMPLETED | OUTPATIENT
Start: 2023-11-20 | End: 2023-11-20

## 2023-11-20 RX ADMIN — IBUPROFEN 600 MG: 600 TABLET, FILM COATED ORAL at 22:44

## 2023-11-20 RX ADMIN — ACETAMINOPHEN 650 MG: 325 TABLET, FILM COATED ORAL at 22:44

## 2023-11-20 RX ADMIN — CEPHALEXIN 500 MG: 500 CAPSULE ORAL at 22:43

## 2023-11-21 NOTE — ED PROVIDER NOTES
ED Provider Note    CHIEF COMPLAINT  Chief Complaint   Patient presents with    Foot Pain     Pt reports bilateral foot pain, states he was seen here for the same about 2 weeks ago. States blisters to feet and pain with pressure       EXTERNAL RECORDS REVIEWED  Outpatient Notes visit from 11/6 where patient was noted to have blisters on both of his feet no signs of infection discharge after analgesics    HPI/ROS  LIMITATION TO HISTORY     OUTSIDE HISTORIAN(S):      Kavon Yang is a 48 y.o. male who presents presents with bilateral foot pain.  Says that he is currently homeless and walks frequently at nighttime.  Patient says he has had worsening pain in both of the bottom of his feet which is worse when he comes inside.  Patient denies fever, chills, leg pain or swelling.  Patient denies drug or alcohol use.    PAST MEDICAL HISTORY   has a past medical history of Mood swing and Psychiatric disorder.    SURGICAL HISTORY   has a past surgical history that includes umbilical hernia repair (N/A, 2/18/2020).    FAMILY HISTORY  History reviewed. No pertinent family history.    SOCIAL HISTORY  Social History     Tobacco Use    Smoking status: Every Day     Current packs/day: 0.50     Average packs/day: 0.5 packs/day for 30.0 years (15.0 ttl pk-yrs)     Types: Cigarettes    Smokeless tobacco: Former    Tobacco comments:     3 cigs/ day; hx 2 ppd x 20years   Vaping Use    Vaping Use: Never used   Substance and Sexual Activity    Alcohol use: Yes     Comment: rare    Drug use: Yes     Types: Marijuana, Inhaled     Comment: marajuana daily/meth rarely    Sexual activity: Yes     Partners: Female       CURRENT MEDICATIONS  Home Medications       Reviewed by Thuy Torres R.N. (Registered Nurse) on 11/20/23 at 2145  Med List Status: Not Addressed     Medication Last Dose Status   acetaminophen (TYLENOL) 500 MG Tab  Active   benzonatate (TESSALON) 100 MG Cap  Active   ibuprofen (MOTRIN) 600 MG Tab  Active   ketorolac  (TORADOL) 10 MG Tab  Active   lidocaine (LIDODERM) 5 % Patch  Active   methylPREDNISolone (MEDROL DOSEPAK) 4 MG Tablet Therapy Pack  Active   naproxen (NAPROSYN) 500 MG Tab  Active                    ALLERGIES  Allergies   Allergen Reactions    Codeine Shortness of Breath and Swelling       PHYSICAL EXAM  VITAL SIGNS: /78   Pulse 96   Temp 36.9 °C (98.5 °F)   Resp 18   Ht 1.829 m (6')   Wt 92.5 kg (204 lb)   SpO2 94%   BMI 27.67 kg/m²    Constitutional: Alert in no apparent distress.  HENT: No signs of trauma, Bilateral external ears normal, Nose normal.   Eyes: Pupils are equal and reactive, Conjunctiva normal, Non-icteric.   Neck: Normal range of motion, No tenderness, Supple, No stridor.   Lymphatic: No lymphadenopathy noted.   Cardiovascular: Regular rate and rhythm, no murmurs.   Thorax & Lungs: Normal breath sounds, No respiratory distress, No wheezing, No chest tenderness.   Abdomen: Bowel sounds normal, Soft, No tenderness, No masses, No pulsatile masses.   Skin: Moist macerated skin across bilateral plantar feet, slight pallor across plantar feet with surrounding hyperemia multiple areas of calluses and blistering slight erythema across bilateral lateral feet no crepitus, no bullae, no bony tenderness  Back: No bony tenderness, No CVA tenderness.   Extremities: Intact distal pulses normal DP/PT bilaterally, No edema, No tenderness, No cyanosis  Musculoskeletal: Good range of motion in all major joints. No tenderness to palpation or major deformities noted.  Joint swelling  Neurologic: Alert , Normal motor function, Normal sensory function, No focal deficits noted.       DIAGNOSTIC STUDIES / PROCEDURES  EKG      LABS      RADIOLOGY      COURSE & MEDICAL DECISION MAKING    ED Observation Status?     INITIAL ASSESSMENT, COURSE AND PLAN  Care Narrative: Vital signs within normal limits.  Patient without systemic signs of infection.  No history or exam to suggest DVT or arterial insufficiency.  No  signs of joint swelling suggestive of septic arthritis.  No signs of necrotizing soft tissue infection.  Patient does have significant placed macerated skin with slight pallor and surrounding areas of hyperemia suggestive of early trench foot.  Patient reports history of trench foot.  Patient says he has been switching on his socks and trying to dry his feet as best he can but says that he sweats frequently while walking outside and unfortunately has not had housing.  Patient has signs of possible mild cellulitis.  Patient given additional socks instructions on foot care and prescription for oral antibiotics.  Patient is hopeful that he will be able to gain housing in the next few days.  Patient will avoid walking outside at nighttime if possible.  Discussed with patient return precautions regarding uncontrolled pain, fever.        ADDITIONAL PROBLEM LIST    DISPOSITION AND DISCUSSIONS    Decision tools and prescription drugs considered including, but not limited to: Antibiotics for possible cellulitis .    FINAL DIAGNOSIS  1. Trench foot, unspecified laterality, initial encounter    2. Cellulitis of lower extremity, unspecified laterality           Electronically signed by: Vikas Yi D.O., 11/21/2023 12:59 AM

## 2023-11-21 NOTE — ED TRIAGE NOTES
Chief Complaint   Patient presents with    Foot Pain     Pt reports bilateral foot pain, states he was seen here for the same about 2 weeks ago. States blisters to feet and pain with pressure       Pt BIB EMS to triage via w/c for above complaint. Presents with bilateral foot pain, pt reports there has been no room at shelters so he has been forced to walk around [place] now to try to keep warm.    Pt back to lobby, educated on triage process and encourage to alert staff of any changes.     Vitals:    11/20/23 2138   BP: (!) 135/94   Pulse: (!) 103   Resp: 18   Temp: 36.9 °C (98.5 °F)   SpO2: 97%

## 2023-11-21 NOTE — ED NOTES
Pt brought from lobby to [facility] [address] via wheelchair. Pt is now sitting up in bed with even and unlabored breaths, in no apparent distress at this time. Will continue to monitor pt while awaiting orders.

## 2023-11-21 NOTE — ED NOTES
Pt educated regarding discharge instructions and demonstrated understanding. Pt ambulatory upon discharge and does not appear to be in any distress at this time. Pt's discharge paperwork and belongings, including multiple sets of hospital socks, sent with pt.

## 2023-11-21 NOTE — ED NOTES
Pt medicated per MAR and tolerated well. Pt provided with Homelessness Resource Packet at his request and is making phone calls at this time to try to find somewhere to stay.

## 2023-11-21 NOTE — DISCHARGE INSTRUCTIONS
As we discussed you should switch out your socks frequently and keep your feet dry and warm.  You can use ibuprofen or Tylenol for pain every 6 hours as needed.  If develop fever, uncontrolled pain you should return to the emergency department.

## 2025-05-24 ENCOUNTER — APPOINTMENT (OUTPATIENT)
Dept: RADIOLOGY | Facility: MEDICAL CENTER | Age: 50
End: 2025-05-24
Attending: EMERGENCY MEDICINE
Payer: COMMERCIAL

## 2025-05-24 ENCOUNTER — HOSPITAL ENCOUNTER (EMERGENCY)
Facility: MEDICAL CENTER | Age: 50
End: 2025-05-24
Attending: EMERGENCY MEDICINE
Payer: COMMERCIAL

## 2025-05-24 VITALS
OXYGEN SATURATION: 97 % | BODY MASS INDEX: 30.79 KG/M2 | SYSTOLIC BLOOD PRESSURE: 126 MMHG | WEIGHT: 227.29 LBS | HEIGHT: 72 IN | HEART RATE: 78 BPM | TEMPERATURE: 97.6 F | DIASTOLIC BLOOD PRESSURE: 83 MMHG | RESPIRATION RATE: 16 BRPM

## 2025-05-24 DIAGNOSIS — S46.911A STRAIN OF RIGHT SHOULDER, INITIAL ENCOUNTER: Primary | ICD-10-CM

## 2025-05-24 PROCEDURE — 700102 HCHG RX REV CODE 250 W/ 637 OVERRIDE(OP): Mod: UD | Performed by: EMERGENCY MEDICINE

## 2025-05-24 PROCEDURE — 73030 X-RAY EXAM OF SHOULDER: CPT | Mod: RT

## 2025-05-24 PROCEDURE — A9270 NON-COVERED ITEM OR SERVICE: HCPCS | Mod: UD | Performed by: EMERGENCY MEDICINE

## 2025-05-24 PROCEDURE — 99284 EMERGENCY DEPT VISIT MOD MDM: CPT

## 2025-05-24 RX ORDER — NAPROXEN 500 MG/1
500 TABLET ORAL 2 TIMES DAILY WITH MEALS
Qty: 20 TABLET | Refills: 0 | Status: SHIPPED | OUTPATIENT
Start: 2025-05-24 | End: 2025-06-03

## 2025-05-24 RX ORDER — IBUPROFEN 600 MG/1
600 TABLET, FILM COATED ORAL ONCE
Status: COMPLETED | OUTPATIENT
Start: 2025-05-24 | End: 2025-05-24

## 2025-05-24 RX ADMIN — IBUPROFEN 600 MG: 600 TABLET, FILM COATED ORAL at 16:49

## 2025-05-24 NOTE — ED TRIAGE NOTES
Chief Complaint   Patient presents with    Shoulder Pain     Ambulates to triage reports right shoulder pain when his dog pulled on the leash. States shoulder feels dislocated and has past history of it. Denies trauma/fall. Limited range of motion in right upper extremity.     Educated on triage process. Instructed to notify staff for any worsening symptoms. Denies any recent travel. Denies exposure to known covid positive patients. Denies any respiratory symptoms.

## 2025-05-24 NOTE — ED NOTES
Patient given discharge instructions, home care instructions explained, patient verbalized understanding of instructions given/patient understands importance of follow up, patient ambulatory to ER [facility].

## 2025-05-24 NOTE — ED PROVIDER NOTES
ED Provider Note    CHIEF COMPLAINT  Chief Complaint   Patient presents with    Shoulder Pain     Ambulates to triage reports right shoulder pain when his dog pulled on the leash. States shoulder feels dislocated and has past history of it. Denies trauma/fall. Limited range of motion in right upper extremity.       HPI/ROS    Kavon Yang is a 50 y.o. male who presents with right shoulder pain.  The patient states that he was walking his dog when the dog suddenly pulled on the leash and he feels like he dislocated his right shoulder as he had dislocations in the past.  He has limited range of motion at the right shoulder.  He does not have any elbow nor wrist pain.  He has localized discomfort to the right shoulder.    PAST MEDICAL HISTORY   has a past medical history of Mood swing and Psychiatric disorder.    SURGICAL HISTORY   has a past surgical history that includes umbilical hernia repair (N/A, 2/18/2020).    FAMILY HISTORY  History reviewed. No pertinent family history.    SOCIAL HISTORY  Social History     Tobacco Use    Smoking status: Every Day     Current packs/day: 0.50     Average packs/day: 0.5 packs/day for 30.0 years (15.0 ttl pk-yrs)     Types: Cigarettes    Smokeless tobacco: Former    Tobacco comments:     3 cigs/ day; hx 2 ppd x 20years   Vaping Use    Vaping status: Never Used   Substance and Sexual Activity    Alcohol use: Yes     Comment: rare    Drug use: Yes     Types: Marijuana, Inhaled     Comment: marajuana daily/meth rarely    Sexual activity: Yes     Partners: Female       CURRENT MEDICATIONS  Home Medications       Reviewed by Mayela Meredith R.N. (Registered Nurse) on 05/24/25 at 1508  Med List Status: Partial     Medication Last Dose Status   benzonatate (TESSALON) 100 MG Cap  Active   ibuprofen (MOTRIN) 600 MG Tab  Active   ketorolac (TORADOL) 10 MG Tab  Active   lidocaine (LIDODERM) 5 % Patch  Active   methylPREDNISolone (MEDROL DOSEPAK) 4 MG Tablet Therapy Pack  Active                   Audit from Redirected Encounters    **Home medications have not yet been reviewed for this encounter**         ALLERGIES  Allergies[1]    PHYSICAL EXAM  VITAL SIGNS: BP (!) 142/100   Pulse 86   Temp 36.7 °C (98.1 °F) (Temporal)   Resp 18   Ht 1.829 m (6')   Wt 103 kg (227 lb 4.7 oz)   SpO2 97%   BMI 30.83 kg/m²    In general the patient appears uncomfortable    Extremities patient does have diffuse discomfort to the right shoulder.  There is no obvious step-offs.  He does have limited range of motion due to pain.  He is a normal right elbow and right wrist exam.    Skin no erythema nor induration    Neurovascular examination is grossly intact to the right upper extremity      RADIOLOGY/PROCEDURES   I have independently interpreted the diagnostic imaging associated with this visit and am waiting the final reading from the radiologist.   My preliminary interpretation is as follows: X-ray was reviewed there is no acute fracture nor dislocation    Radiologist interpretation:  DX-SHOULDER 2+ RIGHT   Final Result      1.  No acute osseous abnormality.   2.  Calcific tendinosis of the rotator cuff.          COURSE & MEDICAL DECISION MAKING    This a 50-year-old male who presents with right shoulder discomfort after being pulled in extension.  I suspect this is more of a soft tissue etiology.  X-ray does not show any evidence of a fracture nor dislocation.  The patient be placed in a sling for support and will place him on naproxen.  He will receive Motrin prior to discharge.  I like the patient to follow-up orthopedics if he is not better in 7 to 10 days.    The patient is aware his blood pressure is elevated needs to be followed up on an outpatient basis.  I suspect this is secondary to pain.    FINAL DIAGNOSIS  Right shoulder strain    Disposition  The patient will be discharged in stable condition     Electronically signed by: Goran Mi M.D., 5/24/2025 4:09 PM           [1]   Allergies  Allergen  Reactions    Codeine Shortness of Breath and Swelling

## (undated) DEVICE — GOWN SURGICAL XX-LARGE - (28EA/CA) SIRUS NON REINFORCED

## (undated) DEVICE — PACK MINOR BASIN - (2EA/CA)

## (undated) DEVICE — KIT ANESTHESIA W/CIRCUIT & 3/LT BAG W/FILTER (20EA/CA)

## (undated) DEVICE — HEAD HOLDER JUNIOR/ADULT

## (undated) DEVICE — GLOVE SZ 7.5 BIOGEL PI MICRO - PF LF (50PR/BX)

## (undated) DEVICE — ELECTRODE 850 FOAM ADHESIVE - HYDROGEL RADIOTRNSPRNT (50/PK)

## (undated) DEVICE — CATHETER IV 20 GA X 1-1/4 ---SURG.& SDS ONLY--- (50EA/BX)

## (undated) DEVICE — TUBE CONNECTING SUCTION - CLEAR PLASTIC STERILE 72 IN (50EA/CA)

## (undated) DEVICE — ELECTRODE DUAL RETURN W/ CORD - (50/PK)

## (undated) DEVICE — CANISTER SUCTION 3000ML MECHANICAL FILTER AUTO SHUTOFF MEDI-VAC NONSTERILE LF DISP  (40EA/CA)

## (undated) DEVICE — WATER IRRIGATION STERILE 1000ML (12EA/CA)

## (undated) DEVICE — CANISTER SUCTION RIGID RED 1500CC (40EA/CA)

## (undated) DEVICE — LACTATED RINGERS INJ 1000 ML - (14EA/CA 60CA/PF)

## (undated) DEVICE — SUTURE 0 ETHIBOND CT-2 C/R 8 X 18 (12PK/BX)"

## (undated) DEVICE — PROTECTOR ULNA NERVE - (36PR/CA)

## (undated) DEVICE — SUCTION INSTRUMENT YANKAUER BULBOUS TIP W/O VENT (50EA/CA)

## (undated) DEVICE — DRAPE LAPAROTOMY T SHEET - (12EA/CA)

## (undated) DEVICE — MASK ANESTHESIA ADULT  - (100/CA)

## (undated) DEVICE — MANIFOLD NEPTUNE 1 PORT (20/PK)

## (undated) DEVICE — KIT  I.V. START (100EA/CA)

## (undated) DEVICE — TUBING CLEARLINK DUO-VENT - C-FLO (48EA/CA)

## (undated) DEVICE — SUTURE GENERAL

## (undated) DEVICE — SENSOR SPO2 NEO LNCS ADHESIVE (20/BX) SEE USER NOTES

## (undated) DEVICE — SET EXTENSION WITH 2 PORTS (48EA/CA) ***PART #2C8610 IS A SUBSTITUTE*****

## (undated) DEVICE — SODIUM CHL IRRIGATION 0.9% 1000ML (12EA/CA)

## (undated) DEVICE — GLOVE BIOGEL PI ORTHO SZ 7.5 PF LF (40PR/BX)

## (undated) DEVICE — SET LEADWIRE 5 LEAD BEDSIDE DISPOSABLE ECG (1SET OF 5/EA)

## (undated) DEVICE — CHLORAPREP 26 ML APPLICATOR - ORANGE TINT(25/CA)